# Patient Record
Sex: MALE | Race: WHITE
[De-identification: names, ages, dates, MRNs, and addresses within clinical notes are randomized per-mention and may not be internally consistent; named-entity substitution may affect disease eponyms.]

---

## 2017-01-31 ENCOUNTER — HOSPITAL ENCOUNTER (EMERGENCY)
Dept: HOSPITAL 47 - EC | Age: 67
Discharge: HOME | End: 2017-01-31
Payer: MEDICARE

## 2017-01-31 VITALS — TEMPERATURE: 97.9 F | HEART RATE: 67 BPM | DIASTOLIC BLOOD PRESSURE: 73 MMHG | SYSTOLIC BLOOD PRESSURE: 133 MMHG

## 2017-01-31 VITALS — RESPIRATION RATE: 18 BRPM

## 2017-01-31 DIAGNOSIS — Z88.0: ICD-10-CM

## 2017-01-31 DIAGNOSIS — G20: ICD-10-CM

## 2017-01-31 DIAGNOSIS — Z87.891: ICD-10-CM

## 2017-01-31 DIAGNOSIS — Z91.81: ICD-10-CM
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
## 2017-01-31 DIAGNOSIS — S22.32XA: Primary | ICD-10-CM
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
## 2017-01-31 LAB
ALP SERPL-CCNC: 81 U/L (ref 38–126)
ALT SERPL-CCNC: 23 U/L (ref 21–72)
ANION GAP SERPL CALC-SCNC: 10 MMOL/L
AST SERPL-CCNC: 21 U/L (ref 17–59)
BASOPHILS # BLD AUTO: 0 K/UL (ref 0–0.2)
BASOPHILS NFR BLD AUTO: 0 %
BUN SERPL-SCNC: 18 MG/DL (ref 9–20)
CALCIUM SPEC-MCNC: 9 MG/DL (ref 8.4–10.2)
CH: 31.9
CHCM: 34.2
CHLORIDE SERPL-SCNC: 104 MMOL/L (ref 98–107)
CO2 SERPL-SCNC: 29 MMOL/L (ref 22–30)
EOSINOPHIL # BLD AUTO: 0.2 K/UL (ref 0–0.7)
EOSINOPHIL NFR BLD AUTO: 3 %
ERYTHROCYTE [DISTWIDTH] IN BLOOD BY AUTOMATED COUNT: 4.9 M/UL (ref 4.3–5.9)
ERYTHROCYTE [DISTWIDTH] IN BLOOD: 12.5 % (ref 11.5–15.5)
GLUCOSE SERPL-MCNC: 93 MG/DL (ref 74–99)
HCT VFR BLD AUTO: 45.9 % (ref 39–53)
HDW: 2.85
HGB BLD-MCNC: 15.1 GM/DL (ref 13–17.5)
LUC NFR BLD AUTO: 3 %
LYMPHOCYTES # SPEC AUTO: 1.9 K/UL (ref 1–4.8)
LYMPHOCYTES NFR SPEC AUTO: 25 %
MCH RBC QN AUTO: 30.9 PG (ref 25–35)
MCHC RBC AUTO-ENTMCNC: 33 G/DL (ref 31–37)
MCV RBC AUTO: 93.8 FL (ref 80–100)
MONOCYTES # BLD AUTO: 0.5 K/UL (ref 0–1)
MONOCYTES NFR BLD AUTO: 6 %
NEUTROPHILS # BLD AUTO: 4.6 K/UL (ref 1.3–7.7)
NEUTROPHILS NFR BLD AUTO: 62 %
NON-AFRICAN AMERICAN GFR(MDRD): >60
POTASSIUM SERPL-SCNC: 4.8 MMOL/L (ref 3.5–5.1)
PROT SERPL-MCNC: 6.9 G/DL (ref 6.3–8.2)
SODIUM SERPL-SCNC: 143 MMOL/L (ref 137–145)
WBC # BLD AUTO: 0.2 10*3/UL
WBC # BLD AUTO: 7.4 K/UL (ref 3.8–10.6)
WBC (PEROX): 7.41

## 2017-01-31 PROCEDURE — 96365 THER/PROPH/DIAG IV INF INIT: CPT

## 2017-01-31 PROCEDURE — 85025 COMPLETE CBC W/AUTO DIFF WBC: CPT

## 2017-01-31 PROCEDURE — 96375 TX/PRO/DX INJ NEW DRUG ADDON: CPT

## 2017-01-31 PROCEDURE — 99283 EMERGENCY DEPT VISIT LOW MDM: CPT

## 2017-01-31 PROCEDURE — 96366 THER/PROPH/DIAG IV INF ADDON: CPT

## 2017-01-31 PROCEDURE — 80053 COMPREHEN METABOLIC PANEL: CPT

## 2017-01-31 PROCEDURE — 74177 CT ABD & PELVIS W/CONTRAST: CPT

## 2017-01-31 PROCEDURE — 71260 CT THORAX DX C+: CPT

## 2017-01-31 PROCEDURE — 36415 COLL VENOUS BLD VENIPUNCTURE: CPT

## 2017-01-31 NOTE — ED
General Adult HPI





- General


Chief complaint: Fall


Stated complaint: Fall/Ribs hurting


Time Seen by Provider: 01/31/17 21:05


Source: patient, RN notes reviewed


Mode of arrival: ambulatory


Limitations: no limitations





- History of Present Illness


Initial comments: 





This is a 67-year-old male who presents to the emergency department stating he 

fell 4 days ago up against a dresser.  Patient states he has Parkinson disease 

and these often off balance occasionally will fall.  Patient states he struck 

the left lateral aspect of his ribs and his tenderness in that area as well as 

just under the ribs.  Patient denies any nausea or vomiting.  Patient denies 

any difficulty breathing shortness of breath.  Patient states the pain seemed 

of gotten worse today and that is the reason he came to the emergency 

department.  Patient denies any head injury or neck injury.  Patient denies any 

numbness weakness.





- Related Data


 Previous Rx's











 Medication  Instructions  Recorded


 


Hydrocodone/Acetaminophen [Norco 1 each PO Q4HR PRN #20 tab 01/31/17





5-325]  


 


Ibuprofen [Motrin] 600 mg PO Q6HR PRN #20 tab 01/31/17











 Allergies











Allergy/AdvReac Type Severity Reaction Status Date / Time


 


Penicillins AdvReac  Diarrhea Verified 01/31/17 22:22





   (C-Diff)  














Review of Systems


ROS Statement: 


Those systems with pertinent positive or pertinent negative responses have been 

documented in the HPI.





ROS Other: All systems not noted in ROS Statement are negative.





Past Medical History


Additional Past Medical History / Comment(s): parkinson's


History of Any Multi-Drug Resistant Organisms: C-DIFF


Date of last positivie culture/infection: 01/24/2017


MDRO Source:: "marginal" c-diff, pt states this is "all gone"


Past Surgical History: Orthopedic Surgery


Additional Past Surgical History / Comment(s): right knee sx, back sx


Past Psychological History: No Psychological Hx Reported


Smoking Status: Former smoker


Past Alcohol Use History: Occasional


Past Drug Use History: None Reported





General Exam





- General Exam Comments


Initial Comments: 





GENERAL:


Patient is well-developed and well-nourished.  Patient is nontoxic and well-

hydrated and is in mild distress.





ENT:


Neck is soft and supple.  No significant lymphadenopathy is noted.  Oropharynx 

is clear.  Moist mucous membranes.  Neck has full range of motion without 

eliciting any pain.  





EYES:


The sclera were anicteric and conjunctiva were pink and moist.  Extraocular 

movements were intact and pupils were equal round and reactive to light.  

Eyelids were unremarkable.





PULMONARY:


Unlabored respirations.  Good breath sounds bilaterally.  No audible rales 

rhonchi or wheezing was noted.





CARDIOVASCULAR:


There is a regular rate and rhythm without any murmurs gallops or rubs.  There 

is ecchymosis just below the left lateral ribs the area above that on the 12th 

11th and 10th rib are is tender there is also tenderness in the left upper 

quadrant.





ABDOMEN:


There is tenderness in the left upper quadrant  No palpable organomegaly was 

noted.  There is no palpable pulsatile mass.





SKIN:


Skin is clear with no lesions or rashes and otherwise unremarkable.





NEUROLOGIC:


Patient is alert and oriented x3.  Cranial nerves II through XII are grossly 

intact.  Motor and sensory are also intact.  Normal speech, volume and content.

  Symmetrical smile.  .





MUSCULOSKELETAL:


Normal extremities with adequate strength and full range of motion.  No lower 

extremity swelling or edema.  No calf tenderness.





LYMPHATICS:


No significant lymphadenopathy is noted





PSYCHIATRIC:


Normal psychiatric evaluation.  


Limitations: no limitations





Course


 Vital Signs











  01/31/17





  21:08


 


Temperature 97.0 F L


 


Pulse Rate 74


 


Respiratory 18





Rate 


 


Blood Pressure 160/81


 


O2 Sat by Pulse 98





Oximetry 














Medical Decision Making





- Medical Decision Making





CT of the chest abdomen pelvis showed a 12th rib fracture on the left.





- Lab Data


Result diagrams: 


 01/31/17 21:39





 01/31/17 21:39


 Lab Results











  01/31/17 01/31/17 Range/Units





  21:39 21:39 


 


WBC  7.4   (3.8-10.6)  k/uL


 


RBC  4.90   (4.30-5.90)  m/uL


 


Hgb  15.1   (13.0-17.5)  gm/dL


 


Hct  45.9   (39.0-53.0)  %


 


MCV  93.8   (80.0-100.0)  fL


 


MCH  30.9   (25.0-35.0)  pg


 


MCHC  33.0   (31.0-37.0)  g/dL


 


RDW  12.5   (11.5-15.5)  %


 


Plt Count  296   (150-450)  k/uL


 


Neutrophils %  62   %


 


Lymphocytes %  25   %


 


Monocytes %  6   %


 


Eosinophils %  3   %


 


Basophils %  0   %


 


Neutrophils #  4.6   (1.3-7.7)  k/uL


 


Lymphocytes #  1.9   (1.0-4.8)  k/uL


 


Monocytes #  0.5   (0-1.0)  k/uL


 


Eosinophils #  0.2   (0-0.7)  k/uL


 


Basophils #  0.0   (0-0.2)  k/uL


 


Sodium   143  (137-145)  mmol/L


 


Potassium   4.8  (3.5-5.1)  mmol/L


 


Chloride   104  ()  mmol/L


 


Carbon Dioxide   29  (22-30)  mmol/L


 


Anion Gap   10  mmol/L


 


BUN   18  (9-20)  mg/dL


 


Creatinine   1.10  (0.66-1.25)  mg/dL


 


Est GFR (MDRD) Af Amer   >60  (>60 ml/min/1.73 sqM)  


 


Est GFR (MDRD) Non-Af   >60  (>60 ml/min/1.73 sqM)  


 


Glucose   93  (74-99)  mg/dL


 


Calcium   9.0  (8.4-10.2)  mg/dL


 


Total Bilirubin   0.8  (0.2-1.3)  mg/dL


 


AST   21  (17-59)  U/L


 


ALT   23  (21-72)  U/L


 


Alkaline Phosphatase   81  ()  U/L


 


Total Protein   6.9  (6.3-8.2)  g/dL


 


Albumin   4.0  (3.5-5.0)  g/dL














Disposition


Clinical Impression: 


 Rib fracture





Disposition: HOME SELF-CARE


Condition: Good


Instructions:  Rib Fracture (ED)


Prescriptions: 


Hydrocodone/Acetaminophen [Norco 5-325] 1 each PO Q4HR PRN #20 tab


 PRN Reason: Pain


Ibuprofen [Motrin] 600 mg PO Q6HR PRN #20 tab


 PRN Reason: For pain   


Referrals: 


Sammy Boyer MD [Primary Care Provider] - 1-2 days


Time of Disposition: 23:34

## 2017-01-31 NOTE — CT
EXAMINATION TYPE: CT ChestAbdPelvis w con

 

DATE OF EXAM: 1/31/2017 10:46 PM

 

COMPARISON: NONE

 

HISTORY: Pt states of left side rib and abdominal pain after fall injury x3 days ago.

 

CT DLP: 1067.3 mGycm

Automated exposure control for dose reduction was used.

 

CONTRAST: 

CT scan of the chest, abdomen and pelvis is performed without Oral Contrast and with IV Contrast, pat
ient injected with 100 mL of Omnipaque 300.

 

FINDINGS:

 

LUNGS: Mild atelectasis and scarring is suggested in both lung bases posteriorly. Mild emphysematous 
changes are suggested bilaterally. Faint interstitial opacities are noted in the right midlung field 
and are probably related to chronic changes. No significant lung nodules are noted.

There is evidence of acute left 12th rib fracture in the posterior lateral aspect. No pneumothorax or
 pleural effusion is noted.

 

MEDIASTINUM: There are no greater than 1 cm hilar or mediastinal lymph nodes.   No pericardial effusi
on is seen.  

 

OTHER:  No additional significant abnormality is seen.

 

LIVER/GB: No significant abnormality is appreciated.

 

PANCREAS: No significant abnormality is seen.

 

SPLEEN: Spleen appears intact at present with adjacent left 12th rib fracture.

 

ADRENALS: No significant abnormality is seen.

 

KIDNEYS: There is 3.2 x 4.5 cm benign-appearing cyst in the upper pole area of right kidney. No signi
ficant hydronephrosis or obstructing stones are noted in both kidneys.

 

BOWEL:  Moderate to large amount of fecal material is noted in the colon and patient is constipated. 
There is mild colonic diverticulosis. Appendix is gas-filled and appears grossly unremarkable in the 
axial image 91.

 

REPRODUCTIVE ORGANS: No gross abnormality seen.

 

LYMPH NODES: No greater than 1 cm abdominal or pelvic lymph nodes are appreciated.

 

OSSEOUS STRUCTURES: Multilevel degenerative changes are present in the thoracolumbar spine with mild 
old wedge compression deformities of mid and lower thoracic vertebrae. Multilevel degenerative disc d
isease changes and vacuum disc phenomenon are noted in the lower thoracic and lumbar spine.

 

There is evidence of acute slightly displaced left 12th rib fracture in the posterolateral aspect in 
the axial image 70 and coronal image 71. Possibility of nondisplaced left 11th rib fracture cannot be
 excluded. Surrounding mild soft tissue swelling is suggested at the fracture site of the left 12th r
ib.

 

There is slight irregularity in the anterior portions of acetabulum in the axial image 114 bilaterall
y and is probably related to old fracture changes. Somewhat sclerotic foci are noted in the right edmundo
ac bone and are most likely benign bone islands.

 

OTHER: Mild atherosclerotic calcification is noted in the abdominal aorta and iliac arteries.

 

IMPRESSION: 

1. Left 12th rib acute slightly displaced fracture in the posterior lateral aspect with surrounding s
oft tissue swelling.

2. No pneumothorax or pleural effusion. Mild atelectasis is noted in both lung bases.

3. Right renal cyst.

4. Patient is constipated. Mild colonic diverticulosis.

5. No definite solid organ injury is noted. Spleen appears intact.

A phone report is given to Dr. Hayes at the time of the dictation.

## 2017-02-14 ENCOUNTER — HOSPITAL ENCOUNTER (EMERGENCY)
Dept: HOSPITAL 47 - EC | Age: 67
Discharge: HOME | End: 2017-02-14
Payer: MEDICARE

## 2017-02-14 VITALS — HEART RATE: 70 BPM | DIASTOLIC BLOOD PRESSURE: 82 MMHG | SYSTOLIC BLOOD PRESSURE: 145 MMHG

## 2017-02-14 VITALS — TEMPERATURE: 97.1 F

## 2017-02-14 VITALS — RESPIRATION RATE: 18 BRPM

## 2017-02-14 DIAGNOSIS — Z88.0: ICD-10-CM

## 2017-02-14 DIAGNOSIS — R41.82: ICD-10-CM

## 2017-02-14 DIAGNOSIS — I63.9: Primary | ICD-10-CM

## 2017-02-14 DIAGNOSIS — Z87.891: ICD-10-CM

## 2017-02-14 LAB
ALP SERPL-CCNC: 82 U/L (ref 38–126)
ALT SERPL-CCNC: 19 U/L (ref 21–72)
ANION GAP SERPL CALC-SCNC: 7 MMOL/L
APTT BLD: 23.2 SEC (ref 22–30)
AST SERPL-CCNC: 24 U/L (ref 17–59)
BASOPHILS # BLD AUTO: 0 K/UL (ref 0–0.2)
BASOPHILS NFR BLD AUTO: 1 %
BUN SERPL-SCNC: 16 MG/DL (ref 9–20)
CALCIUM SPEC-MCNC: 8.8 MG/DL (ref 8.4–10.2)
CH: 31.8
CHCM: 33.9
CHLORIDE SERPL-SCNC: 105 MMOL/L (ref 98–107)
CO2 SERPL-SCNC: 29 MMOL/L (ref 22–30)
EOSINOPHIL # BLD AUTO: 0.3 K/UL (ref 0–0.7)
EOSINOPHIL NFR BLD AUTO: 5 %
ERYTHROCYTE [DISTWIDTH] IN BLOOD BY AUTOMATED COUNT: 4.57 M/UL (ref 4.3–5.9)
ERYTHROCYTE [DISTWIDTH] IN BLOOD: 12.8 % (ref 11.5–15.5)
GLUCOSE BLD-MCNC: 90 MG/DL (ref 75–99)
GLUCOSE SERPL-MCNC: 87 MG/DL (ref 74–99)
HCT VFR BLD AUTO: 43 % (ref 39–53)
HDW: 2.81
HGB BLD-MCNC: 14.1 GM/DL (ref 13–17.5)
INR PPP: 1 (ref ?–1.1)
LUC NFR BLD AUTO: 3 %
LYMPHOCYTES # SPEC AUTO: 2 K/UL (ref 1–4.8)
LYMPHOCYTES NFR SPEC AUTO: 32 %
MCH RBC QN AUTO: 30.8 PG (ref 25–35)
MCHC RBC AUTO-ENTMCNC: 32.7 G/DL (ref 31–37)
MCV RBC AUTO: 94.1 FL (ref 80–100)
MONOCYTES # BLD AUTO: 0.4 K/UL (ref 0–1)
MONOCYTES NFR BLD AUTO: 6 %
NEUTROPHILS # BLD AUTO: 3.3 K/UL (ref 1.3–7.7)
NEUTROPHILS NFR BLD AUTO: 53 %
NON-AFRICAN AMERICAN GFR(MDRD): >60
PH UR: 6.5 [PH] (ref 5–8)
POTASSIUM SERPL-SCNC: 4.2 MMOL/L (ref 3.5–5.1)
PROT SERPL-MCNC: 6.6 G/DL (ref 6.3–8.2)
PT BLD: 10.4 SEC (ref 9–12)
SODIUM SERPL-SCNC: 141 MMOL/L (ref 137–145)
SP GR UR: 1.01 (ref 1–1.03)
UA BILLING (MACRO VS. MICRO): (no result)
UROBILINOGEN UR QL STRIP: <2 MG/DL (ref ?–2)
WBC # BLD AUTO: 0.2 10*3/UL
WBC # BLD AUTO: 6.2 K/UL (ref 3.8–10.6)
WBC (PEROX): 6.17

## 2017-02-14 PROCEDURE — 36415 COLL VENOUS BLD VENIPUNCTURE: CPT

## 2017-02-14 PROCEDURE — 85025 COMPLETE CBC W/AUTO DIFF WBC: CPT

## 2017-02-14 PROCEDURE — 71010: CPT

## 2017-02-14 PROCEDURE — 80053 COMPREHEN METABOLIC PANEL: CPT

## 2017-02-14 PROCEDURE — 81003 URINALYSIS AUTO W/O SCOPE: CPT

## 2017-02-14 PROCEDURE — 84484 ASSAY OF TROPONIN QUANT: CPT

## 2017-02-14 PROCEDURE — 85730 THROMBOPLASTIN TIME PARTIAL: CPT

## 2017-02-14 PROCEDURE — 85610 PROTHROMBIN TIME: CPT

## 2017-02-14 PROCEDURE — 70450 CT HEAD/BRAIN W/O DYE: CPT

## 2017-02-14 PROCEDURE — 99285 EMERGENCY DEPT VISIT HI MDM: CPT

## 2017-02-14 PROCEDURE — 93005 ELECTROCARDIOGRAM TRACING: CPT

## 2017-02-14 NOTE — ED
Neuro HPI





- General


Chief Complaint: Neuro Symptoms/Deficit


Stated Complaint: CVA


Time Seen by Provider: 02/14/17 18:42


Source: patient


Mode of arrival: EMS


Limitations: no limitations





- History of Present Illness


Is the patient presenting with stroke symptoms?: Yes


Last Known Well Date: 02/14/17


-: hour(s)


Initial Comments: 





's patient is a 67-year-old man brought to be evaluated for altered mental 

status.  The patient had been at home tonight, and his wife called to ask him 

to give her phone number.  He seemed to not be responding correctly and was 

unable to locate a number and give it to her which he otherwise would've been 

able to do.  He was subsequently brought here concern of possible stroke.  The 

exact onset time is not able to be established.


Location: speech, altered


History of same: Yes


Place: home


Improves With: none


Worsens With: none


Context: sudden onset


Associated Symptoms: confusion


Treatments Prior to Arrival: none





- Related Data


Home Medications: 


 Home Medications











 Medication  Instructions  Recorded  Confirmed


 


Amantadine HCl [Symmetrel] 100 mg PO TID 02/14/17 02/14/17


 


Carbidopa/Levodopa/Entacapone 1 tab PO 5XD 02/14/17 02/14/17





[Stalevo 200]   











Allergies/Adverse Reactions: 


 Allergies











Allergy/AdvReac Type Severity Reaction Status Date / Time


 


Penicillins AdvReac  Diarrhea Verified 02/14/17 20:04





   (C-Diff)  














Review of Systems


ROS Statement: 


Those systems with pertinent positive or pertinent negative responses have been 

documented in the HPI.





ROS Other: All systems not noted in ROS Statement are negative.


Constitutional: Denies: fever, chills


Eyes: Denies: vision change


ENT: Denies: hearing loss


Respiratory: Denies: cough, dyspnea


Cardiovascular: Denies: chest pain, palpitations, edema, syncope


Gastrointestinal: Denies: abdominal pain, vomiting, diarrhea


Genitourinary: Denies: dysuria, hematuria


Musculoskeletal: Denies: back pain


Skin: Denies: rash


Neurological: Reports: as per HPI, confusion.  Denies: headache, weakness, 

numbness, paresthesias





General Exam


Limitations: no limitations


General appearance: alert, in no apparent distress


Head exam: Present: atraumatic, normocephalic, normal inspection


Eye exam: Present: normal appearance, PERRL, EOMI.  Absent: scleral icterus, 

conjunctival injection


ENT exam: Present: normal oropharynx


Neck exam: Present: normal inspection, full ROM


Respiratory exam: Present: normal lung sounds bilaterally.  Absent: respiratory 

distress, wheezes, rales, rhonchi, stridor


Cardiovascular Exam: Present: regular rate, normal heart sounds.  Absent: 

systolic murmur, diastolic murmur


GI/Abdominal exam: Present: soft.  Absent: distended, tenderness, guarding, 

rebound, rigid


Extremities exam: Present: normal inspection, normal capillary refill.  Absent: 

pedal edema, calf tenderness


Back exam: Present: normal inspection.  Absent: CVA tenderness (R), CVA 

tenderness (L)


Neurological exam: Present: alert, oriented X3.  Absent: CN II-XII intact, 

motor sensory deficit


Skin exam: Present: warm, dry, intact, normal color.  Absent: rash





Stroke MDM





- Lab Data


Result diagrams: 


 02/14/17 18:44





 02/14/17 18:44


 Lab Results











  02/14/17 02/14/17 02/14/17 Range/Units





  18:36 18:44 18:44 


 


WBC   6.2   (3.8-10.6)  k/uL


 


RBC   4.57   (4.30-5.90)  m/uL


 


Hgb   14.1   (13.0-17.5)  gm/dL


 


Hct   43.0   (39.0-53.0)  %


 


MCV   94.1   (80.0-100.0)  fL


 


MCH   30.8   (25.0-35.0)  pg


 


MCHC   32.7   (31.0-37.0)  g/dL


 


RDW   12.8   (11.5-15.5)  %


 


Plt Count   182   (150-450)  k/uL


 


Neutrophils %   53   %


 


Lymphocytes %   32   %


 


Monocytes %   6   %


 


Eosinophils %   5   %


 


Basophils %   1   %


 


Neutrophils #   3.3   (1.3-7.7)  k/uL


 


Lymphocytes #   2.0   (1.0-4.8)  k/uL


 


Monocytes #   0.4   (0-1.0)  k/uL


 


Eosinophils #   0.3   (0-0.7)  k/uL


 


Basophils #   0.0   (0-0.2)  k/uL


 


PT     (9.0-12.0)  sec


 


INR     (<1.1)  


 


APTT     (22.0-30.0)  sec


 


Sodium    141  (137-145)  mmol/L


 


Potassium    4.2  (3.5-5.1)  mmol/L


 


Chloride    105  ()  mmol/L


 


Carbon Dioxide    29  (22-30)  mmol/L


 


Anion Gap    7  mmol/L


 


BUN    16  (9-20)  mg/dL


 


Creatinine    0.80  (0.66-1.25)  mg/dL


 


Est GFR (MDRD) Af Amer    >60  (>60 ml/min/1.73 sqM)  


 


Est GFR (MDRD) Non-Af    >60  (>60 ml/min/1.73 sqM)  


 


Glucose    87  (74-99)  mg/dL


 


POC Glucose (mg/dL)  90    (75-99)  mg/dL


 


POC Glu Operater ID  Marychuy Arenas    


 


Calcium    8.8  (8.4-10.2)  mg/dL


 


Total Bilirubin    0.9  (0.2-1.3)  mg/dL


 


AST    24  (17-59)  U/L


 


ALT    19 L  (21-72)  U/L


 


Alkaline Phosphatase    82  ()  U/L


 


Troponin I     (0.000-0.034)  ng/mL


 


Total Protein    6.6  (6.3-8.2)  g/dL


 


Albumin    3.8  (3.5-5.0)  g/dL


 


Urine Color     


 


Urine Appearance     (Clear)  


 


Urine pH     (5.0-8.0)  


 


Ur Specific Gravity     (1.001-1.035)  


 


Urine Protein     (Negative)  


 


Urine Glucose (UA)     (Negative)  


 


Urine Ketones     (Negative)  


 


Urine Blood     (Negative)  


 


Urine Nitrate     (Negative)  


 


Urine Bilirubin     (Negative)  


 


Urine Urobilinogen     (<2.0)  mg/dL


 


Ur Leukocyte Esterase     (Negative)  














  02/14/17 02/14/17 02/14/17 Range/Units





  18:44 18:44 19:44 


 


WBC     (3.8-10.6)  k/uL


 


RBC     (4.30-5.90)  m/uL


 


Hgb     (13.0-17.5)  gm/dL


 


Hct     (39.0-53.0)  %


 


MCV     (80.0-100.0)  fL


 


MCH     (25.0-35.0)  pg


 


MCHC     (31.0-37.0)  g/dL


 


RDW     (11.5-15.5)  %


 


Plt Count     (150-450)  k/uL


 


Neutrophils %     %


 


Lymphocytes %     %


 


Monocytes %     %


 


Eosinophils %     %


 


Basophils %     %


 


Neutrophils #     (1.3-7.7)  k/uL


 


Lymphocytes #     (1.0-4.8)  k/uL


 


Monocytes #     (0-1.0)  k/uL


 


Eosinophils #     (0-0.7)  k/uL


 


Basophils #     (0-0.2)  k/uL


 


PT  10.4    (9.0-12.0)  sec


 


INR  1.0    (<1.1)  


 


APTT  23.2    (22.0-30.0)  sec


 


Sodium     (137-145)  mmol/L


 


Potassium     (3.5-5.1)  mmol/L


 


Chloride     ()  mmol/L


 


Carbon Dioxide     (22-30)  mmol/L


 


Anion Gap     mmol/L


 


BUN     (9-20)  mg/dL


 


Creatinine     (0.66-1.25)  mg/dL


 


Est GFR (MDRD) Af Amer     (>60 ml/min/1.73 sqM)  


 


Est GFR (MDRD) Non-Af     (>60 ml/min/1.73 sqM)  


 


Glucose     (74-99)  mg/dL


 


POC Glucose (mg/dL)     (75-99)  mg/dL


 


POC Glu Operater ID     


 


Calcium     (8.4-10.2)  mg/dL


 


Total Bilirubin     (0.2-1.3)  mg/dL


 


AST     (17-59)  U/L


 


ALT     (21-72)  U/L


 


Alkaline Phosphatase     ()  U/L


 


Troponin I   <0.012   (0.000-0.034)  ng/mL


 


Total Protein     (6.3-8.2)  g/dL


 


Albumin     (3.5-5.0)  g/dL


 


Urine Color    Dark Yellow  


 


Urine Appearance    Clear  (Clear)  


 


Urine pH    6.5  (5.0-8.0)  


 


Ur Specific Gravity    1.013  (1.001-1.035)  


 


Urine Protein    Negative  (Negative)  


 


Urine Glucose (UA)    Negative  (Negative)  


 


Urine Ketones    Negative  (Negative)  


 


Urine Blood    Negative  (Negative)  


 


Urine Nitrate    Negative  (Negative)  


 


Urine Bilirubin    Negative  (Negative)  


 


Urine Urobilinogen    <2.0  (<2.0)  mg/dL


 


Ur Leukocyte Esterase    Negative  (Negative)  














- Medical Decision Making





Patient is 67-year-old man presenting with change in mental status, mainly 

expressive aphasia, which has resolved.  Per the patient's family he does seem 

back to his usual self.  The ABCD 2 score indicates that the patient may be 

suitable for outpatient workup and that is his expressed desire.  I did offer 

admission with neurology consultation, but at this point he is declining.  They 

will return should there be any recurrence of symptoms or any new symptoms.  He 

is otherwise going to follow up to have further workup within the coming few 

days.





Past Medical History


Additional Past Medical History / Comment(s): parkinson's


History of Any Multi-Drug Resistant Organisms: C-DIFF


Date of last positivie culture/infection: 01/24/2017


MDRO Source:: "marginal" c-diff, pt states this is "all gone"


Past Surgical History: Orthopedic Surgery


Additional Past Surgical History / Comment(s): right knee sx, back sx


Past Psychological History: No Psychological Hx Reported


Smoking Status: Former smoker


Past Alcohol Use History: Occasional


Past Drug Use History: None Reported





Course


 Vital Signs











  02/14/17 02/14/17 02/14/17





  18:32 19:05 19:15


 


Temperature 97.1 F L  


 


Pulse Rate 62 58 L 61


 


Respiratory 16 16 18





Rate   


 


Blood Pressure 148/96 133/74 131/72


 


O2 Sat by Pulse 97 98 98





Oximetry   














  02/14/17 02/14/17





  19:30 20:00


 


Temperature  


 


Pulse Rate 58 L 70


 


Respiratory 18 18





Rate  


 


Blood Pressure 131/77 145/82


 


O2 Sat by Pulse 99 97





Oximetry  














Disposition


Clinical Impression: 


 Transient cerebral ischemia, Mental status change, Cerebrovascular accident





Disposition: HOME SELF-CARE


Condition: Good


Instructions:  Transient Ischemic Attack (ED)


Referrals: 


Sammy Boyer MD [Primary Care Provider] - 1-2 days


Saeid Chiu MD [STAFF PHYSICIAN] - 1-2 days

## 2017-02-14 NOTE — XR
EXAMINATION TYPE: XR chest 1V portable

 

DATE OF EXAM: 2/14/2017 7:05 PM

 

COMPARISON: NONE

 

HISTORY: Altered mental status

 

TECHNIQUE: Single frontal view of the chest is obtained.

 

FINDINGS:  There is no focal air space opacity, pleural effusion, or pneumothorax seen.  The cardiac 
silhouette size is within normal limits.   There are overlying cardiac leads. The osseous structures 
are intact.

 

IMPRESSION:  No acute process.

## 2017-02-14 NOTE — CT
EXAMINATION TYPE: CT brain wo con

 

DATE OF EXAM: 2/14/2017 7:03 PM

 

COMPARISON: NONE

 

HISTORY: Possible cva. Headache and memory changes. Altered mental status History of Parkinson's

 

CT DLP: 1116.10 mGycm

Automated exposure control for dose reduction was used.

 

FINDINGS: 

There is no acute intracranial hemorrhage, mass effect, or midline shift identified.  The ventricles 
and sulci are within normal limits in size. There is mild cortical atrophy. Periventricular white mat
ter demyelination is suspected. The globes are intact and the visualized sinuses are clear.

 

IMPRESSION: 

No acute intracranial hemorrhage, mass effect, or midline shift is seen.

## 2018-06-14 ENCOUNTER — HOSPITAL ENCOUNTER (EMERGENCY)
Dept: HOSPITAL 47 - EC | Age: 68
Discharge: HOME | End: 2018-06-14
Payer: MEDICARE

## 2018-06-14 VITALS — DIASTOLIC BLOOD PRESSURE: 62 MMHG | SYSTOLIC BLOOD PRESSURE: 130 MMHG | HEART RATE: 57 BPM

## 2018-06-14 VITALS — TEMPERATURE: 98.4 F | RESPIRATION RATE: 18 BRPM

## 2018-06-14 DIAGNOSIS — Z88.0: ICD-10-CM

## 2018-06-14 DIAGNOSIS — M51.36: Primary | ICD-10-CM
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
## 2018-06-14 DIAGNOSIS — M48.061: ICD-10-CM

## 2018-06-14 DIAGNOSIS — M99.73: ICD-10-CM

## 2018-06-14 DIAGNOSIS — G20: ICD-10-CM

## 2018-06-14 DIAGNOSIS — Z79.899: ICD-10-CM

## 2018-06-14 DIAGNOSIS — Z87.891: ICD-10-CM

## 2018-06-14 DIAGNOSIS — M41.86: ICD-10-CM

## 2018-06-14 DIAGNOSIS — M43.17: ICD-10-CM

## 2018-06-14 DIAGNOSIS — Z98.890: ICD-10-CM

## 2018-06-14 PROCEDURE — 99283 EMERGENCY DEPT VISIT LOW MDM: CPT

## 2018-06-14 PROCEDURE — 72131 CT LUMBAR SPINE W/O DYE: CPT

## 2018-06-14 NOTE — CT
EXAMINATION TYPE: CT lumbar spine wo con

 

DATE OF EXAM: 6/14/2018 12:57 PM

 

COMPARISON: NONE

 

HISTORY: Low back pain

 

CT DLP: 440 mGycm

Automated exposure control for dose reduction was used.

 

TECHNIQUE: Unenhanced CT of the lumbar spine was performed.  Bone and soft tissue window settings are
 submitted as well as coronal and sagittal reconstructions. 

 

Osseous findings: There is severe degenerative changes of the lumbar spine. No evidence of vertebral 
body height loss. There is very mild grade 1 anterolisthesis of L5 on S1, likely on a degenerative ba
sis intervertebral disc desiccation, multilevel vacuum disc disease, anterior osteophytes, facet arth
ropathy, endplate sclerosis and intervertebral disc space narrowing are noted. Minimal bibasilar subs
egmental atelectasis is seen at the lung bases. Exophytic right upper pole renal cyst is seen measuri
ng 4.6 cm. Mild-to-moderate atherosclerosis of the abdominal aorta and its branches are incidentally 
seen. There is a S-shaped scoliotic curvature of the lumbar spine.

 

L1-L2: There is disc desiccation and a broad-based disc bulge with mild bilateral neural foraminal na
rrowing. No significant spinal canal stenosis.

 

L2-L3: There is facet arthropathy and disc desiccation with a broad-based disc bulge and osteophytes 
creating moderate left and mild right neural foraminal narrowing. No spinal canal stenosis.

 

L3-L4: Extensive facet arthropathy, curvature from the scoliosis, and degenerative disc disease creat
e moderate to severe left and mild right neural foraminal narrowing in addition to moderate spinal ca
nal stenosis.

 

L4-L5: Extensive facet arthropathy and degenerative disc disease create severe right and mild left ne
ural foraminal narrowing.

 

L5-S1: There is disc uncovering and a broad-based disc bulge resulting in mild left and mild-to-moder
ate right neural foraminal narrowing. No spinal canal stenosis.

 

IMPRESSION:

1. Extensive multilevel lumbar degenerative disc disease and S-shaped scoliosis of the lumbar spine t
hat contribute to multilevel spinal canal stenosis and variable degree neural foraminal narrowing as 
described above.

2. No evidence of vertebral body height loss. Grade 1 anterolisthesis of L5 on S1 is likely degenerat
fazal in nature.

## 2018-06-14 NOTE — ED
General Adult HPI





- General


Chief complaint: Back Pain/Injury


Stated complaint: back pain


Time Seen by Provider: 06/14/18 11:50


Source: patient, RN notes reviewed


Mode of arrival: ambulatory


Limitations: no limitations





- History of Present Illness


Initial comments: 





Chief complaint and history of present illness this is a 68-year-old male who 

reports that approximately 2:58 AM this morning he rolled over in bed and 

developed acute severe discomfort to his right paralumbar region and radiates 

to the right upper buttock.  The patient does have Parkinson's.  He denies 

having any difficulty urinating or bowel movements since then.  His wife gave 

him a pain pill source helped relieve some of the pain.





- Related Data


 Home Medications











 Medication  Instructions  Recorded  Confirmed


 


Amantadine HCl [Symmetrel] 100 mg PO TID 02/14/17 06/14/18


 


Carbidopa/Levodopa/Entacapone 1 tab PO 5XD 02/14/17 06/14/18





[Stalevo 200]   


 


Meloxicam [Mobic] 15 mg PO DAILY PRN 06/14/18 06/14/18








 Previous Rx's











 Medication  Instructions  Recorded


 


Acetaminophen with Codeine 1 tab PO Q4H PRN 3 Days #18 tab 06/14/18





[Tylenol w/codeine #3]  


 


methylPREDNISolone Dose Pack 4 mg PO AS DIRECTED #21 package 06/14/18





[Medrol Dose Pack]  











 Allergies











Allergy/AdvReac Type Severity Reaction Status Date / Time


 


Penicillins AdvReac  Diarrhea Verified 06/14/18 11:42





   (C-Diff)  














Review of Systems


ROS Statement: 


Those systems with pertinent positive or pertinent negative responses have been 

documented in the HPI.


Review of systems.  Patient denies any headache no visual acuity changes no 

cervical or thoracic pain.  Has discomfort to the lumbar region as noted above.

  No numbness no tingling distally.  No difficulty urinating or bowel 

movements.  No complaints of any nausea vomiting or diarrhea.  No new deficits.

  Patient has Parkinson's for the past 15 years.  All systems are reviewed.  

Past medical problem Parkinson's 15 years.  Denies any other medical problems.  

Surgeries include right knee surgery many years ago as well as back surgery 

probably laminectomy and no problems since this morning at 2 AM.  Family 

history no cancers.  Patient denies any ALLERGIES.  He quit smoking over 30 

years ago drinks alcohol occasionally socially.








ROS Other: All systems not noted in ROS Statement are negative.





Past Medical History


Additional Past Medical History / Comment(s): parkinson's


History of Any Multi-Drug Resistant Organisms: C-DIFF


Date of last positivie culture/infection: 01/24/2017


MDRO Source:: "marginal" c-diff, pt states this is "all gone"


Past Surgical History: Orthopedic Surgery


Additional Past Surgical History / Comment(s): right knee sx, back sx


Past Psychological History: No Psychological Hx Reported


Smoking Status: Former smoker


Past Alcohol Use History: Occasional


Past Drug Use History: None Reported





General Exam





- General Exam Comments


Initial Comments: 





General:


The patient is awake and alert, here because of pain to the right paralumbar 

region after rolling over at 2 AM.  Pain persists.  Vital signs temperature 

97.5 pulse 87 respiratory rate 20 pulse ox 99% room air blood pressure 126/67


Eye:


Pupils are equal, round and reactive to light, extra-ocular movements are intact

; there is normal conjunctiva bilaterally. No signs of icterus. 


Ears, nose, mouth and throat:


There are moist mucous membranes and no oral lesions. 


Neck:


The neck is supple,  


Cardiovascular:


There is a regular rate and rhythm. No murmur, rub or gallop is appreciated.


Respiratory:


Lungs are clear to auscultation, respirations are non-labored, breath sounds 

are equal. No wheezes, stridor, rales, or rhonchi.


Gastrointestinal:


Soft, non-distended, non-tender abdomen without masses or organomegaly noted. 

There is no rebound or guarding present. No CVA tenderness. Bowel sounds are 

unremarkable.


Back:


Evidence of previous surgery in the lumbar region.  Probably laminectomy from 

his description of the procedure.  Also pain in the right paralumbar region 

radiating to the upper right buttock area.  No rash noted.  Early shingles 

discussed.  No pain to palpation over the area.  Pain does increase with a 

twisting or turning or bending forward.  Denies any difficulty urinating or 

bowel movements.


Musculoskeletal:


Upper or lower extremities otherwise normal.


Neurological:


The patient has Parkinson's, with tremor.  For over 15 years.  He states no new 

deficits other than the acute pain suffered while rolling over in bed's morning 

at 2 AM.  No foot drop.


Skin:


No rashes


Psychiatric:


Cooperative, 





Limitations: no limitations





Course


 Vital Signs











  06/14/18 06/14/18





  11:30 12:16


 


Temperature 97.5 F L 


 


Pulse Rate 87 62


 


Respiratory 20 17





Rate  


 


Blood Pressure 126/67 136/70


 


O2 Sat by Pulse 99 100





Oximetry  














Medical Decision Making





- Medical Decision Making





Medical decision making; this is a 68-year-old male with Parkinson's.  He 

reports that around 2 AM this morning while rolling over in bed he did develop 

an acute discomfort which is persisted until he received a pain pill from his 

wife.  Still uncomfortable in the lumbar spine right sided which radiates to 

the right buttock area.  The radiologist reviewed the CAT scan the lumbar spine 

and her impression is extensive multilevel lumbar degenerative disc disease and 

S-shaped scoliosis of the lumbar spine that contribute 2 multilevel spinal 

canal stenosis and variable degree neural foraminal narrowing as described in 

the body of the report.  No evidence of vertebral body height loss.  Grade 1 

anterolisthesis of L5 on S1 is likely degenerative in nature.





I discussed with the patient the findings per CAT scan.  The plan the patient 

be placed on Medrol Dosepak advised to take Tylenol 3 which worked at home for 

pain also advised follow-up with family physician.  He'll be given the name of 

the on-call back surgeon, Dr. Stewart.





Disposition


Clinical Impression: 


 Degenerative disc disease, lumbar





Disposition: HOME SELF-CARE


Condition: Fair


Instructions:  Acute Low Back Pain (ED), Degenerative Disc Disease (ED), Lumbar 

Spinal Stenosis (ED)


Additional Instructions: 


Take medications as directed, follow-up with family physician.  Also follow-up 

with on-call back surgeon Dr. Stewart


Prescriptions: 


Acetaminophen with Codeine [Tylenol w/codeine #3] 1 tab PO Q4H PRN 3 Days #18 

tab


 PRN Reason: Pain


methylPREDNISolone Dose Pack [Medrol Dose Pack] 4 mg PO AS DIRECTED #21 package


Is patient prescribed a controlled substance at d/c from ED?: Yes


When asked, does pt state using other controlled substances?: No


If prescribed controlled substance>3 days was MAPS reviewed?: No


If opioid is for acute pain is fill amount 7 days or less?: Yes


If Rx opioid, was Start Talking consent form obtained?: Yes


Referrals: 


None,Stated [Primary Care Provider] - 1-2 days


WINIFRED Stewart DO [Doctor of Osteopathic Medicine] - 1-2 days


Time of Disposition: 13:27

## 2019-02-25 ENCOUNTER — HOSPITAL ENCOUNTER (OUTPATIENT)
Dept: HOSPITAL 47 - RADMRIMAIN | Age: 69
Discharge: HOME | End: 2019-02-25
Attending: PSYCHIATRY & NEUROLOGY
Payer: MEDICARE

## 2019-02-25 DIAGNOSIS — R90.89: Primary | ICD-10-CM

## 2019-02-25 DIAGNOSIS — F82: ICD-10-CM

## 2019-02-25 DIAGNOSIS — H53.2: ICD-10-CM

## 2019-02-25 PROCEDURE — 70553 MRI BRAIN STEM W/O & W/DYE: CPT

## 2019-02-25 PROCEDURE — 36415 COLL VENOUS BLD VENIPUNCTURE: CPT

## 2019-02-25 PROCEDURE — 82565 ASSAY OF CREATININE: CPT

## 2019-02-25 NOTE — MR
EXAMINATION TYPE: MR brain wo/w con

 

DATE OF EXAM: 2/25/2019

 

COMPARISON: CT 2/14/2017

 

HISTORY: 69-year-old male Visual disturbances

 

TECHNIQUE:  Multiplanar, multisequence images of the brain and brainstem were acquired before and aft
er administration of  11 mL IV Gadavist.  Diffusion weighted imaging is performed. Fast brain protoco
l was utilized.

 

FINDINGS:  

No evidence for acute infarction, hemorrhage, mass, mass effect, midline shift, herniation, effacemen
t of basal cisterns, or extra-axial fluid collection. 

 

The ventricles and sulci are age-appropriate.

 

Major intracranial flow voids are intact. The left vertebral artery is dominant.

 

T2/FLAIR weighted sequences show mild to moderate scattered burden of bright white matter change in t
he subcortical, deep, and periventricular regions of both cerebral hemispheres.

 

Midline structures demonstrate normal morphology.  The craniocervical junction is normal. 

 

Post contrast images demonstrate no evidence of pathologic enhancement.  Dural venous sinuses are pat
ent.

 

The visualized sinuses are clear and the globes are intact. Rightward nasal septal deviation. Small a
mount of fluid within the inferior right mastoid air cells.

 

 

IMPRESSION:

 

1. No acute intracranial abnormality seen.

2. Mild-to-moderate scattered burden of T2 bright white matter change, nonspecific, likely representi
ng changes of chronic small vessel ischemic disease.

3. Incidental small amount of trapped fluid in the inferior right mastoid air cells. Correlate for an
y mastoid pain to exclude mastoiditis.

## 2019-09-19 ENCOUNTER — HOSPITAL ENCOUNTER (EMERGENCY)
Dept: HOSPITAL 47 - EC | Age: 69
Discharge: ANOTHER HOSPITAL | End: 2019-09-19
Payer: MEDICARE

## 2019-09-19 VITALS — TEMPERATURE: 98.2 F | DIASTOLIC BLOOD PRESSURE: 78 MMHG | HEART RATE: 79 BPM | SYSTOLIC BLOOD PRESSURE: 141 MMHG

## 2019-09-19 VITALS — RESPIRATION RATE: 18 BRPM
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
## 2019-09-19 DIAGNOSIS — S80.211A: ICD-10-CM

## 2019-09-19 DIAGNOSIS — G20: ICD-10-CM

## 2019-09-19 DIAGNOSIS — S01.511A: ICD-10-CM

## 2019-09-19 DIAGNOSIS — Z79.82: ICD-10-CM
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
## 2019-09-19 DIAGNOSIS — Z88.0: ICD-10-CM

## 2019-09-19 DIAGNOSIS — S12.500A: ICD-10-CM

## 2019-09-19 DIAGNOSIS — Z91.030: ICD-10-CM

## 2019-09-19 DIAGNOSIS — Z79.899: ICD-10-CM

## 2019-09-19 DIAGNOSIS — S12.400A: Primary | ICD-10-CM

## 2019-09-19 DIAGNOSIS — S50.311A: ICD-10-CM

## 2019-09-19 DIAGNOSIS — S60.512A: ICD-10-CM
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
## 2019-09-19 DIAGNOSIS — S01.81XA: ICD-10-CM

## 2019-09-19 DIAGNOSIS — M43.22: ICD-10-CM

## 2019-09-19 DIAGNOSIS — S01.21XA: ICD-10-CM

## 2019-09-19 DIAGNOSIS — R41.82: ICD-10-CM

## 2019-09-19 DIAGNOSIS — Z87.891: ICD-10-CM

## 2019-09-19 PROCEDURE — 99285 EMERGENCY DEPT VISIT HI MDM: CPT

## 2019-09-19 PROCEDURE — 72125 CT NECK SPINE W/O DYE: CPT

## 2019-09-19 PROCEDURE — 73080 X-RAY EXAM OF ELBOW: CPT

## 2019-09-19 PROCEDURE — 96374 THER/PROPH/DIAG INJ IV PUSH: CPT

## 2019-09-19 PROCEDURE — 73562 X-RAY EXAM OF KNEE 3: CPT

## 2019-09-19 PROCEDURE — 12015 RPR F/E/E/N/L/M 7.6-12.5 CM: CPT

## 2019-09-19 PROCEDURE — 70486 CT MAXILLOFACIAL W/O DYE: CPT

## 2019-09-19 PROCEDURE — 12002 RPR S/N/AX/GEN/TRNK2.6-7.5CM: CPT

## 2019-09-19 PROCEDURE — 73130 X-RAY EXAM OF HAND: CPT

## 2019-09-19 PROCEDURE — 70450 CT HEAD/BRAIN W/O DYE: CPT

## 2019-09-19 NOTE — XR
EXAMINATION TYPE: XR hand complete LT

 

DATE OF EXAM: 9/19/2019

 

CLINICAL HISTORY: pain

 

TECHNIQUE:  Frontal, lateral and oblique images of the left hand are obtained.

 

COMPARISON: None.

 

FINDINGS:  There is no acute fracture/dislocation evident. The joint spaces appear within normal limi
ts.  The overlying soft tissue appears unremarkable.

 

IMPRESSION:  

There is no acute fracture or dislocation.

 

ICD 10 NO FRACTURE, INITIAL EVALUATION
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
## 2019-09-19 NOTE — XR
EXAMINATION TYPE: XR elbow complete RT

 

DATE OF EXAM: 9/19/2019

 

CLINICAL HISTORY: pain

 

TECHNIQUE:  Frontal, lateral and oblique images of the right elbow are obtained.

 

COMPARISON: None.

 

FINDINGS:  There is no acute fracture/dislocation evident of the elbow.  No abnormal fat pad signs ar
e seen.  The overlying soft tissue appears unremarkable.

 

IMPRESSION:  There is no acute fracture or dislocation of the elbow.

 

ICD 10 NO FRACTURE, INITIAL EVALUATION
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
## 2019-09-19 NOTE — XR
EXAMINATION TYPE: XR knee complete RT

 

DATE OF EXAM: 9/19/2019

 

CLINICAL HISTORY: pain

 

TECHNIQUE:  Three views of the right knee are obtained.

 

COMPARISON: None.

 

FINDINGS:  There is no acute fracture/dislocation.  The tri-compartment joint spaces appear severely 
narrowed. Meniscal chondrocalcinosis identified. The overlying soft tissue appears unremarkable.

 

IMPRESSION:  There is no acute fracture or dislocation.ICD 10 NO FRACTURE, INITIAL EVALUATION

## 2019-11-01 ENCOUNTER — HOSPITAL ENCOUNTER (OUTPATIENT)
Dept: HOSPITAL 47 - RADXRMAIN | Age: 69
Discharge: HOME | End: 2019-11-01
Attending: NEUROLOGICAL SURGERY
Payer: MEDICARE

## 2019-11-01 DIAGNOSIS — M43.13: Primary | ICD-10-CM

## 2019-11-01 DIAGNOSIS — M50.33: ICD-10-CM

## 2019-11-01 DIAGNOSIS — Z98.1: ICD-10-CM

## 2019-11-01 DIAGNOSIS — G95.89: ICD-10-CM

## 2019-11-01 DIAGNOSIS — S12.500D: ICD-10-CM

## 2019-11-01 PROCEDURE — 72052 X-RAY EXAM NECK SPINE 6/>VWS: CPT
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
## 2020-10-07 ENCOUNTER — HOSPITAL ENCOUNTER (EMERGENCY)
Dept: HOSPITAL 47 - EC | Age: 70
Discharge: HOME | End: 2020-10-07
Payer: MEDICARE

## 2020-10-07 VITALS
HEART RATE: 71 BPM | RESPIRATION RATE: 18 BRPM | DIASTOLIC BLOOD PRESSURE: 78 MMHG | TEMPERATURE: 98 F | SYSTOLIC BLOOD PRESSURE: 124 MMHG

## 2020-10-07 DIAGNOSIS — Z88.0: ICD-10-CM

## 2020-10-07 DIAGNOSIS — E86.0: ICD-10-CM

## 2020-10-07 DIAGNOSIS — G20: ICD-10-CM

## 2020-10-07 DIAGNOSIS — K59.89: ICD-10-CM

## 2020-10-07 DIAGNOSIS — Z91.048: ICD-10-CM

## 2020-10-07 DIAGNOSIS — Z79.899: ICD-10-CM

## 2020-10-07 DIAGNOSIS — Z79.82: ICD-10-CM

## 2020-10-07 DIAGNOSIS — K59.00: Primary | ICD-10-CM

## 2020-10-07 LAB
ALBUMIN SERPL-MCNC: 4.1 G/DL (ref 3.5–5)
ALP SERPL-CCNC: 77 U/L (ref 38–126)
ALT SERPL-CCNC: 6 U/L (ref 4–49)
ANION GAP SERPL CALC-SCNC: 4 MMOL/L
AST SERPL-CCNC: 28 U/L (ref 17–59)
BASOPHILS # BLD AUTO: 0 K/UL (ref 0–0.2)
BASOPHILS NFR BLD AUTO: 1 %
BUN SERPL-SCNC: 27 MG/DL (ref 9–20)
CALCIUM SPEC-MCNC: 9.1 MG/DL (ref 8.4–10.2)
CHLORIDE SERPL-SCNC: 105 MMOL/L (ref 98–107)
CO2 SERPL-SCNC: 29 MMOL/L (ref 22–30)
EOSINOPHIL # BLD AUTO: 0.2 K/UL (ref 0–0.7)
EOSINOPHIL NFR BLD AUTO: 3 %
ERYTHROCYTE [DISTWIDTH] IN BLOOD BY AUTOMATED COUNT: 4.72 M/UL (ref 4.3–5.9)
ERYTHROCYTE [DISTWIDTH] IN BLOOD: 12.4 % (ref 11.5–15.5)
GLUCOSE SERPL-MCNC: 105 MG/DL (ref 74–99)
HCT VFR BLD AUTO: 46.2 % (ref 39–53)
HGB BLD-MCNC: 15.4 GM/DL (ref 13–17.5)
LYMPHOCYTES # SPEC AUTO: 1.7 K/UL (ref 1–4.8)
LYMPHOCYTES NFR SPEC AUTO: 28 %
MCH RBC QN AUTO: 32.7 PG (ref 25–35)
MCHC RBC AUTO-ENTMCNC: 33.4 G/DL (ref 31–37)
MCV RBC AUTO: 97.9 FL (ref 80–100)
MONOCYTES # BLD AUTO: 0.3 K/UL (ref 0–1)
MONOCYTES NFR BLD AUTO: 5 %
NEUTROPHILS # BLD AUTO: 3.8 K/UL (ref 1.3–7.7)
NEUTROPHILS NFR BLD AUTO: 61 %
PH UR: 6 [PH] (ref 5–8)
PLATELET # BLD AUTO: 187 K/UL (ref 150–450)
POTASSIUM SERPL-SCNC: 4.6 MMOL/L (ref 3.5–5.1)
PROT SERPL-MCNC: 6.8 G/DL (ref 6.3–8.2)
SODIUM SERPL-SCNC: 138 MMOL/L (ref 137–145)
SP GR UR: 1.03 (ref 1–1.03)
UROBILINOGEN UR QL STRIP: 2 MG/DL (ref ?–2)
WBC # BLD AUTO: 6.1 K/UL (ref 3.8–10.6)

## 2020-10-07 PROCEDURE — 85025 COMPLETE CBC W/AUTO DIFF WBC: CPT

## 2020-10-07 PROCEDURE — 74018 RADEX ABDOMEN 1 VIEW: CPT

## 2020-10-07 PROCEDURE — 83690 ASSAY OF LIPASE: CPT

## 2020-10-07 PROCEDURE — 83605 ASSAY OF LACTIC ACID: CPT

## 2020-10-07 PROCEDURE — 93005 ELECTROCARDIOGRAM TRACING: CPT

## 2020-10-07 PROCEDURE — 81003 URINALYSIS AUTO W/O SCOPE: CPT

## 2020-10-07 PROCEDURE — 99284 EMERGENCY DEPT VISIT MOD MDM: CPT

## 2020-10-07 PROCEDURE — 84484 ASSAY OF TROPONIN QUANT: CPT

## 2020-10-07 PROCEDURE — 36415 COLL VENOUS BLD VENIPUNCTURE: CPT

## 2020-10-07 PROCEDURE — 80053 COMPREHEN METABOLIC PANEL: CPT

## 2020-10-07 PROCEDURE — 96360 HYDRATION IV INFUSION INIT: CPT

## 2020-10-07 NOTE — XR
EXAMINATION TYPE: XR KUB

 

DATE OF EXAM: 10/7/2020 6:17 PM

 

CLINICAL HISTORY:  Abdominal pain and abnormal stool

 

TECHNIQUE: Upright images of the abdomen and pelvis were obtained

 

COMPARISON: None.

 

FINDINGS: Scattered gas is seen in non-distended small bowel loops. Gas and fecal material is seen in
 non-distended colon. There is no visceromegaly, pneumoperitoneum, or abnormal calcification apprecia
jayce. Pelvic phleboliths. The lung bases are clear. Degenerative changes and dextroscoliosis of the althea
mbar spine. Degenerative changes of the hips.

 

IMPRESSION: 

Nonspecific bowel gas pattern.

## 2020-10-07 NOTE — ED
General Adult HPI





- General


Chief complaint: Recheck/Abnormal Lab/Rx


Stated complaint: GI issues


Time Seen by Provider: 10/07/20 17:06


Source: patient, RN notes reviewed, old records reviewed


Mode of arrival: ambulatory


Limitations: no limitations





- History of Present Illness


Initial comments: 


70-year-old male patient presents ED for evaluation of constipation.  Patient 

reports that he has long-term issues with constipation today when he was mowing 

the lawn he had a little bit of leakage of some greasy stools.  He denies any 

abdominal pain nausea vomiting or diarrhea.  Reports he has not had a bowel 

movement in a couple of days.  She reports no history of this morning he felt a 

little bit dizzy however that has since resolved.  Denies any chest pain.  

Denies any other acute complaints.





Systemic: Pt denies fatigue, fever/chills, rash. Pt denies weakness, night 

sweats, weight loss. 


Neuro: Pt denies headache, visual disturbances, syncope or pre-syncope.


HEENT: Pt denies ocular discharge or irritation, otalgia, rhinorrhea, 

pharyngitis or notable lymphadenopathy. 


Cardiopulmonary: Pt denies chest pain, SOB, heart palpitations, dyspnea on exert

ion.  


Abdominal/GI: Pt denies abdominal pain, n/v. 


: Pt denies dysuria, burning w/ urination, frequency/urgency. Denies new onset

urinary or bowel incontinence.  


MSK: Pt denies myalgia, loss of strength or function in extremities. 


Neuro: Pt denies new onset weakness, paresthesias. 








- Related Data


                                Home Medications











 Medication  Instructions  Recorded  Confirmed


 


amantadine HCL [Symmetrel] 100 mg PO BID 02/14/17 10/07/20


 


Aspirin 162 mg PO DAILY 09/19/19 10/07/20


 


Carbidopa-Levodopa  mg 1 tab PO DAILY PRN 10/07/20 10/07/20





[Sinemet  mg]   


 


Carbidopa/Levodopa/Entacapone 1 tab PO TID 10/07/20 10/07/20





[Carbidopa-Levodopa-Enta 200 mg]   


 


Donepezil HCl [Aricept] 10 mg PO DAILY 10/07/20 10/07/20


 


Polyethylene Glycol 3350 [Miralax] 17 gm PO DAILY 10/07/20 10/07/20


 


Tamsulosin HCl [Flomax] 0.4 mg PO W/SUPPER 10/07/20 10/07/20











                                    Allergies











Allergy/AdvReac Type Severity Reaction Status Date / Time


 


bee pollen Allergy  Unknown Verified 10/07/20 18:56


 


Penicillins AdvReac  Diarrhea Verified 10/07/20 18:56





   (C-Diff)  














Review of Systems


ROS Statement: 


Those systems with pertinent positive or pertinent negative responses have been 

documented in the HPI.





ROS Other: All systems not noted in ROS Statement are negative.





Past Medical History


Additional Past Medical History / Comment(s): parkinson's


History of Any Multi-Drug Resistant Organisms: C-DIFF


Date of last positivie culture/infection: 01/24/2017


MDRO Source:: "marginal" c-diff, pt states this is "all gone"


Past Surgical History: Orthopedic Surgery


Additional Past Surgical History / Comment(s): right knee sx, back sx, neck 

surgery


Past Psychological History: No Psychological Hx Reported


Smoking Status: Never smoker


Past Alcohol Use History: Occasional


Past Drug Use History: None Reported





General Exam





- General Exam Comments


Initial Comments: 





Constitutional: NAD, AOX3, Pt has pleasant affect. 


HEENT: NC/AT, trachea midline, neck supple, no lymphadenopathy. Posterior 

pharynx non erythematous, without exudates. External ears appear normal, without

 discharge. Mucous membranes moist. Eyes PERRLA, EOM intact. There is no scleral

 icterus. No pallor noted. 


Cardiopulmonary: RRR, no murmurs, rubs or gallops, no JVD noted. Lungs CTAB in 

anterior and posterior fields. No peripheral edema. 


Abdominal exam: Abdomen soft and non-distended. Abdomen non-tender to palpation 

in all 4 quadrants. Bowel sounds active in LLQ. No hepatosplenomegaly. No 

ecchymosis


Neuro: CN II-XII grossly intact. No nuchal rigidity. No raccon eyes, no york 

sign, no hemotympanum. No cervical spinal tenderness. 


MSK:  Full active ROM in upper and lower extremities, 5/5 stregnth. 








Limitations: no limitations





Course


                                   Vital Signs











  10/07/20





  16:58


 


Temperature 98.0 F


 


Pulse Rate 71


 


Respiratory 18





Rate 


 


Blood Pressure 124/78


 


O2 Sat by Pulse 97





Oximetry 














Medical Decision Making





- Medical Decision Making











70-year-old male patient presents to ED for evaluation.  Patient physical exam 

displayed nontender abdomen.  Laboratory this case.  Mild dehydration patient 

administered fluid bolus.  EKG is nonischemic.  KUB did display some gas and 

fecal material seen.  Disimpaction was attempted I was unable to disimpact.  I 

Therevac was performed patient had a large bowel movement.  We feet digital exam

 reveals empty rectal vault.  I believe that patient's leakage of stool was due 

to a large fecal ball.  Patient discharged to follow-up with his primary care 

provider and return here if any worsening symptoms.  Case discussed with Dr. Chacon. 




















- Lab Data


Result diagrams: 


                                 10/07/20 17:25





                                 10/07/20 17:25


                                   Lab Results











  10/07/20 10/07/20 10/07/20 Range/Units





  17:25 17:25 17:25 


 


WBC  6.1    (3.8-10.6)  k/uL


 


RBC  4.72    (4.30-5.90)  m/uL


 


Hgb  15.4    (13.0-17.5)  gm/dL


 


Hct  46.2    (39.0-53.0)  %


 


MCV  97.9    (80.0-100.0)  fL


 


MCH  32.7    (25.0-35.0)  pg


 


MCHC  33.4    (31.0-37.0)  g/dL


 


RDW  12.4    (11.5-15.5)  %


 


Plt Count  187    (150-450)  k/uL


 


Neutrophils %  61    %


 


Lymphocytes %  28    %


 


Monocytes %  5    %


 


Eosinophils %  3    %


 


Basophils %  1    %


 


Neutrophils #  3.8    (1.3-7.7)  k/uL


 


Lymphocytes #  1.7    (1.0-4.8)  k/uL


 


Monocytes #  0.3    (0-1.0)  k/uL


 


Eosinophils #  0.2    (0-0.7)  k/uL


 


Basophils #  0.0    (0-0.2)  k/uL


 


Sodium    138  (137-145)  mmol/L


 


Potassium    4.6  (3.5-5.1)  mmol/L


 


Chloride    105  ()  mmol/L


 


Carbon Dioxide    29  (22-30)  mmol/L


 


Anion Gap    4  mmol/L


 


BUN    27 H  (9-20)  mg/dL


 


Creatinine    1.12  (0.66-1.25)  mg/dL


 


Est GFR (CKD-EPI)AfAm    78  (>60 ml/min/1.73 sqM)  


 


Est GFR (CKD-EPI)NonAf    67  (>60 ml/min/1.73 sqM)  


 


Glucose    105 H  (74-99)  mg/dL


 


Plasma Lactic Acid [place]     (0.7-2.0)  mmol/L


 


Calcium    9.1  (8.4-10.2)  mg/dL


 


Total Bilirubin    1.7 H  (0.2-1.3)  mg/dL


 


AST    28  (17-59)  U/L


 


ALT    6  (4-49)  U/L


 


Alkaline Phosphatase    77  ()  U/L


 


Troponin I     (0.000-0.034)  ng/mL


 


Total Protein    6.8  (6.3-8.2)  g/dL


 


Albumin    4.1  (3.5-5.0)  g/dL


 


Lipase    91  ()  U/L


 


Urine Color   Dark Yellow   


 


Urine Appearance   Clear   (Clear)  


 


Urine pH   6.0   (5.0-8.0)  


 


Ur Specific Gravity   1.031   (1.001-1.035)  


 


Urine Protein   Trace H   (Negative)  


 


Urine Glucose (UA)   Negative   (Negative)  


 


Urine Ketones   Trace H   (Negative)  


 


Urine Blood   Negative   (Negative)  


 


Urine Nitrite   Negative   (Negative)  


 


Urine Bilirubin   Negative   (Negative)  


 


Urine Urobilinogen   2.0   (<2.0)  mg/dL


 


Ur Leukocyte Esterase   Negative   (Negative)  














  10/07/20 10/07/20 Range/Units





  17:25 17:25 


 


WBC    (3.8-10.6)  k/uL


 


RBC    (4.30-5.90)  m/uL


 


Hgb    (13.0-17.5)  gm/dL


 


Hct    (39.0-53.0)  %


 


MCV    (80.0-100.0)  fL


 


MCH    (25.0-35.0)  pg


 


MCHC    (31.0-37.0)  g/dL


 


RDW    (11.5-15.5)  %


 


Plt Count    (150-450)  k/uL


 


Neutrophils %    %


 


Lymphocytes %    %


 


Monocytes %    %


 


Eosinophils %    %


 


Basophils %    %


 


Neutrophils #    (1.3-7.7)  k/uL


 


Lymphocytes #    (1.0-4.8)  k/uL


 


Monocytes #    (0-1.0)  k/uL


 


Eosinophils #    (0-0.7)  k/uL


 


Basophils #    (0-0.2)  k/uL


 


Sodium    (137-145)  mmol/L


 


Potassium    (3.5-5.1)  mmol/L


 


Chloride    ()  mmol/L


 


Carbon Dioxide    (22-30)  mmol/L


 


Anion Gap    mmol/L


 


BUN    (9-20)  mg/dL


 


Creatinine    (0.66-1.25)  mg/dL


 


Est GFR (CKD-EPI)AfAm    (>60 ml/min/1.73 sqM)  


 


Est GFR (CKD-EPI)NonAf    (>60 ml/min/1.73 sqM)  


 


Glucose    (74-99)  mg/dL


 


Plasma Lactic Acid [place]   1.5  (0.7-2.0)  mmol/L


 


Calcium    (8.4-10.2)  mg/dL


 


Total Bilirubin    (0.2-1.3)  mg/dL


 


AST    (17-59)  U/L


 


ALT    (4-49)  U/L


 


Alkaline Phosphatase    ()  U/L


 


Troponin I  <0.012   (0.000-0.034)  ng/mL


 


Total Protein    (6.3-8.2)  g/dL


 


Albumin    (3.5-5.0)  g/dL


 


Lipase    ()  U/L


 


Urine Color    


 


Urine Appearance    (Clear)  


 


Urine pH    (5.0-8.0)  


 


Ur Specific Gravity    (1.001-1.035)  


 


Urine Protein    (Negative)  


 


Urine Glucose (UA)    (Negative)  


 


Urine Ketones    (Negative)  


 


Urine Blood    (Negative)  


 


Urine Nitrite    (Negative)  


 


Urine Bilirubin    (Negative)  


 


Urine Urobilinogen    (<2.0)  mg/dL


 


Ur Leukocyte Esterase    (Negative)  














- EKG Data


-: EKG Interpreted by Me


EKG Comments: 








 ventricular rate 56, UT interval 94 QRS 86, QT/QTc 460 since 443.  Sinus 

bradycardia, otherwise normal EKG.  No concern for acute ischemia this time.





Disposition


Clinical Impression: 


 Constipation





Disposition: HOME SELF-CARE


Condition: Stable


Instructions (If sedation given, give patient instructions):  Constipation (ED)


Additional Instructions: 


Follow up with PCP tomorrow. Continue your bowel regimen, increase fiber in 

diet. Return to ED with any worsening symptoms. 


Is patient prescribed a controlled substance at d/c from ED?: No


Referrals: 


Scottie Austin MD [Primary Care Provider] - 1-2 days

## 2020-12-11 ENCOUNTER — HOSPITAL ENCOUNTER (INPATIENT)
Dept: HOSPITAL 47 - 4SSUR | Age: 70
LOS: 5 days | Discharge: NURSING, REHAB OR LONG-TERM CARE | DRG: 522 | End: 2020-12-16
Attending: ORTHOPAEDIC SURGERY | Admitting: ORTHOPAEDIC SURGERY
Payer: MEDICARE

## 2020-12-11 DIAGNOSIS — Z86.19: ICD-10-CM

## 2020-12-11 DIAGNOSIS — Z20.828: ICD-10-CM
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
## 2020-12-11 DIAGNOSIS — Z98.890: ICD-10-CM

## 2020-12-11 DIAGNOSIS — Z79.82: ICD-10-CM

## 2020-12-11 DIAGNOSIS — S72.001A: Primary | ICD-10-CM

## 2020-12-11 DIAGNOSIS — Z88.0: ICD-10-CM

## 2020-12-11 DIAGNOSIS — Z91.030: ICD-10-CM

## 2020-12-11 DIAGNOSIS — N40.0: ICD-10-CM

## 2020-12-11 DIAGNOSIS — G20: ICD-10-CM

## 2020-12-11 DIAGNOSIS — R41.89: ICD-10-CM

## 2020-12-11 DIAGNOSIS — M19.91: ICD-10-CM

## 2020-12-11 DIAGNOSIS — Z79.899: ICD-10-CM

## 2020-12-11 DIAGNOSIS — R26.9: ICD-10-CM

## 2020-12-11 LAB
ALBUMIN SERPL-MCNC: 4.1 G/DL (ref 3.5–5)
ALBUMIN/GLOB SERPL: 1.5 {RATIO}
ALP SERPL-CCNC: 91 U/L (ref 38–126)
ALT SERPL-CCNC: 13 U/L (ref 4–49)
ANION GAP SERPL CALC-SCNC: 6 MMOL/L
APTT BLD: 22.7 SEC (ref 22–30)
AST SERPL-CCNC: 24 U/L (ref 17–59)
BASOPHILS # BLD AUTO: 0 K/UL (ref 0–0.2)
BASOPHILS NFR BLD AUTO: 0 %
BUN SERPL-SCNC: 29 MG/DL (ref 9–20)
CALCIUM SPEC-MCNC: 9.2 MG/DL (ref 8.4–10.2)
CHLORIDE SERPL-SCNC: 106 MMOL/L (ref 98–107)
CO2 SERPL-SCNC: 27 MMOL/L (ref 22–30)
EOSINOPHIL # BLD AUTO: 0 K/UL (ref 0–0.7)
EOSINOPHIL NFR BLD AUTO: 0 %
ERYTHROCYTE [DISTWIDTH] IN BLOOD BY AUTOMATED COUNT: 4.41 M/UL (ref 4.3–5.9)
ERYTHROCYTE [DISTWIDTH] IN BLOOD: 12.7 % (ref 11.5–15.5)
GLOBULIN SER CALC-MCNC: 2.7 G/DL
GLUCOSE SERPL-MCNC: 123 MG/DL (ref 74–99)
HCT VFR BLD AUTO: 42.6 % (ref 39–53)
HGB BLD-MCNC: 14.4 GM/DL (ref 13–17.5)
INR PPP: 1 (ref ?–1.2)
LYMPHOCYTES # SPEC AUTO: 0.9 K/UL (ref 1–4.8)
LYMPHOCYTES NFR SPEC AUTO: 8 %
MCH RBC QN AUTO: 32.6 PG (ref 25–35)
MCHC RBC AUTO-ENTMCNC: 33.7 G/DL (ref 31–37)
MCV RBC AUTO: 96.6 FL (ref 80–100)
MONOCYTES # BLD AUTO: 0.8 K/UL (ref 0–1)
MONOCYTES NFR BLD AUTO: 7 %
NEUTROPHILS # BLD AUTO: 9.4 K/UL (ref 1.3–7.7)
NEUTROPHILS NFR BLD AUTO: 83 %
PLATELET # BLD AUTO: 221 K/UL (ref 150–450)
POTASSIUM SERPL-SCNC: 4.3 MMOL/L (ref 3.5–5.1)
PROT SERPL-MCNC: 6.8 G/DL (ref 6.3–8.2)
PT BLD: 10.8 SEC (ref 9–12)
SODIUM SERPL-SCNC: 139 MMOL/L (ref 137–145)
WBC # BLD AUTO: 11.2 K/UL (ref 3.8–10.6)

## 2020-12-11 PROCEDURE — 87635 SARS-COV-2 COVID-19 AMP PRB: CPT

## 2020-12-11 PROCEDURE — 80053 COMPREHEN METABOLIC PANEL: CPT

## 2020-12-11 PROCEDURE — 88305 TISSUE EXAM BY PATHOLOGIST: CPT

## 2020-12-11 PROCEDURE — 73501 X-RAY EXAM HIP UNI 1 VIEW: CPT

## 2020-12-11 PROCEDURE — 93005 ELECTROCARDIOGRAM TRACING: CPT

## 2020-12-11 PROCEDURE — 85730 THROMBOPLASTIN TIME PARTIAL: CPT

## 2020-12-11 PROCEDURE — 88311 DECALCIFY TISSUE: CPT

## 2020-12-11 PROCEDURE — 85025 COMPLETE CBC W/AUTO DIFF WBC: CPT

## 2020-12-11 PROCEDURE — 71045 X-RAY EXAM CHEST 1 VIEW: CPT

## 2020-12-11 PROCEDURE — 85610 PROTHROMBIN TIME: CPT

## 2020-12-11 RX ADMIN — HYDROCODONE BITARTRATE AND ACETAMINOPHEN PRN EACH: 5; 325 TABLET ORAL at 21:41

## 2020-12-11 RX ADMIN — DOCUSATE SODIUM AND SENNOSIDES SCH EACH: 50; 8.6 TABLET ORAL at 21:41

## 2020-12-11 NOTE — P.HPOR
History of Present Illness


H&P Date: 12/11/20


Chief Complaint: Right hip fracture


Patient is a 70 year old male seen in office today 12/11/20. Patient returns 

today for worsening right hip pain over the past week. He's had no new injury. 

However he has had worsening pain with ambulating weightbearing. He denies 

radicular symptoms including numbness or tingling. He has no other acute 

complaints. 





Prior history: 


The patient is a 70 -year old right-hand dominant male with a medical history 

significant for Parkinson's disease who presents today for evaluation of his 

right hip and right elbow. He describes an injury today, 12/01/20 when he fell. 

The patient rates his hip pain today in the office 5/10 and finds his symptoms 

are worse with standing and walking and his elbow pain is 5/10 and worse with 

lifting. He describes pain with weight bearing on the right hip. 0"


The patient describe the injury as: fell on hip and elbow. The date of injury 

was 12/01/2020. The patient is right hand dominant. Patient states his pain 

level is at 5/10. He continues to have pain. He describes his pain as sharp. The

pain is intermittent. Patient states his symptoms are worse with standing and 

walking. Patient does not work outside of his home. The patient has had 

treatment for his symptoms including cane.








Review of Systems


All systems: negative


Constitutional: Denies chills, Denies fever


Eyes: denies blurred vision, denies pain


Ears, nose, mouth and throat: Denies headache, Denies sore throat


Cardiovascular: Denies chest pain, Denies shortness of breath


Respiratory: Denies cough


Gastrointestinal: Denies abdominal pain, Denies diarrhea, Denies nausea, Denies 

vomiting


Musculoskeletal: Denies myalgias


Integumentary: Denies pruritus, Denies rash


Neurological: Denies numbness, Denies weakness


Psychiatric: Denies anxiety, Denies depression


Endocrine: Denies fatigue, Denies weight change





Past Medical History


Additional Past Medical History / Comment(s): parkinson's


History of Any Multi-Drug Resistant Organisms: C-DIFF


Date of last positivie culture/infection: 01/24/2017


MDRO Source:: "marginal" c-diff, pt states this is "all gone"


Past Surgical History: Orthopedic Surgery


Additional Past Surgical History / Comment(s): right knee sx, back sx, neck 

surgery


Past Psychological History: No Psychological Hx Reported


Smoking Status: Never smoker


Past Alcohol Use History: Occasional


Past Drug Use History: None Reported





Medications and Allergies


                                Home Medications











 Medication  Instructions  Recorded  Confirmed  Type


 


amantadine HCL [Symmetrel] 100 mg PO BID 02/14/17 10/07/20 History


 


Aspirin 162 mg PO DAILY 09/19/19 10/07/20 History


 


Carbidopa-Levodopa  mg 1 tab PO DAILY PRN 10/07/20 10/07/20 History





[Sinemet  mg]    


 


Carbidopa/Levodopa/Entacapone 1 tab PO TID 10/07/20 10/07/20 History





[Carbidopa-Levodopa-Enta 200 mg]    


 


Donepezil HCl [Aricept] 10 mg PO DAILY 10/07/20 10/07/20 History


 


Polyethylene Glycol 3350 [Miralax] 17 gm PO DAILY 10/07/20 10/07/20 History


 


Tamsulosin HCl [Flomax] 0.4 mg PO W/SUPPER 10/07/20 10/07/20 History








                                    Allergies











Allergy/AdvReac Type Severity Reaction Status Date / Time


 


bee pollen Allergy  Unknown Verified 10/07/20 18:56


 


Penicillins AdvReac  Diarrhea Verified 10/07/20 18:56





   (C-Diff)  














Physical Examination


Right Hip Examination


On inspection there are no wounds erythema or ecchymosis to the right hip. 


Range of motion was not tested due to the fracture. 


Calf is soft nontender. 


Motor and sensation is intact throughout the lower extremity. 





Full inversion, eversion, plantar flexion and dorsiflexion of the foot. Intact 

sensation at the lateral, medial and plantar first dorsal web spaces. There is 

2+ posterior tibial pulse with brisk capillary refill in all digits. 








Results


Xrays in office today show nondisplaced subcapital femoral neck fracture








Assessment and Plan


(1) Closed right hip fracture


Narrative/Plan: 


Patient has been seen by Dr. Rolon. He has recommended direct admission with 

plan for surgical intervention including right hip hemiarthroplasty for his 

right hip fracture. He is being admitted to the hospital today and we will 

request preoperative clearance and medical management. Case will be boarded for 

tomorrow with plan for 7am, 12/12/20. He is to be nothing by mouth after 

midnight. He will need discharge planning and possible placement. 


Status: Acute   Priority: Medium   Code(s): S72.001A - FRACTURE OF UNSP PART OF 

NECK OF RIGHT FEMUR, INIT   SNOMED Code(s): 401280043


   


Time with Patient: Less than 30

## 2020-12-12 LAB
BASOPHILS # BLD AUTO: 0 K/UL (ref 0–0.2)
BASOPHILS NFR BLD AUTO: 0 %
EOSINOPHIL # BLD AUTO: 0 K/UL (ref 0–0.7)
EOSINOPHIL NFR BLD AUTO: 0 %
ERYTHROCYTE [DISTWIDTH] IN BLOOD BY AUTOMATED COUNT: 4.28 M/UL (ref 4.3–5.9)
ERYTHROCYTE [DISTWIDTH] IN BLOOD: 12.8 % (ref 11.5–15.5)
HCT VFR BLD AUTO: 41.3 % (ref 39–53)
HGB BLD-MCNC: 13.8 GM/DL (ref 13–17.5)
LYMPHOCYTES # SPEC AUTO: 1.4 K/UL (ref 1–4.8)
LYMPHOCYTES NFR SPEC AUTO: 15 %
MCH RBC QN AUTO: 32.3 PG (ref 25–35)
MCHC RBC AUTO-ENTMCNC: 33.5 G/DL (ref 31–37)
MCV RBC AUTO: 96.4 FL (ref 80–100)
MONOCYTES # BLD AUTO: 0.8 K/UL (ref 0–1)
MONOCYTES NFR BLD AUTO: 8 %
NEUTROPHILS # BLD AUTO: 7.2 K/UL (ref 1.3–7.7)
NEUTROPHILS NFR BLD AUTO: 75 %
PLATELET # BLD AUTO: 201 K/UL (ref 150–450)
WBC # BLD AUTO: 9.5 K/UL (ref 3.8–10.6)

## 2020-12-12 PROCEDURE — 0SRR0JA REPLACEMENT OF RIGHT HIP JOINT, FEMORAL SURFACE WITH SYNTHETIC SUBSTITUTE, UNCEMENTED, OPEN APPROACH: ICD-10-PCS

## 2020-12-12 RX ADMIN — POTASSIUM CHLORIDE SCH MLS/HR: 14.9 INJECTION, SOLUTION INTRAVENOUS at 13:03

## 2020-12-12 RX ADMIN — ENTACAPONE SCH MG: 200 TABLET, FILM COATED ORAL at 12:57

## 2020-12-12 RX ADMIN — HYDROCODONE BITARTRATE AND ACETAMINOPHEN PRN EACH: 5; 325 TABLET ORAL at 17:45

## 2020-12-12 RX ADMIN — THERA TABS SCH EACH: TAB at 14:46

## 2020-12-12 RX ADMIN — CARBIDOPA AND LEVODOPA SCH EACH: 25; 100 TABLET ORAL at 21:10

## 2020-12-12 RX ADMIN — DONEPEZIL HYDROCHLORIDE SCH MG: 10 TABLET ORAL at 14:46

## 2020-12-12 RX ADMIN — TAMSULOSIN HYDROCHLORIDE SCH MG: 0.4 CAPSULE ORAL at 17:16

## 2020-12-12 RX ADMIN — ASPIRIN 81 MG CHEWABLE TABLET SCH MG: 81 TABLET CHEWABLE at 14:46

## 2020-12-12 RX ADMIN — CARBIDOPA AND LEVODOPA SCH EACH: 25; 100 TABLET ORAL at 14:47

## 2020-12-12 RX ADMIN — CARBIDOPA AND LEVODOPA SCH: 25; 100 TABLET ORAL at 13:00

## 2020-12-12 RX ADMIN — ENTACAPONE SCH MG: 200 TABLET, FILM COATED ORAL at 21:10

## 2020-12-12 RX ADMIN — DOCUSATE SODIUM AND SENNOSIDES SCH EACH: 50; 8.6 TABLET ORAL at 21:09

## 2020-12-12 RX ADMIN — ENTACAPONE SCH: 200 TABLET, FILM COATED ORAL at 15:15

## 2020-12-12 NOTE — XR
EXAMINATION TYPE: XR Hip Limited RT

 

DATE OF EXAM: 12/12/2020

 

COMPARISON: None

 

HISTORY: Post hip surgery

 

TECHNIQUE: AP right hip is obtained following prosthesis placement

 

FINDINGS: No acute fractures are evident. Right hip prosthesis is present. Femoral component articula
milton with the acetabulum. Postsurgical soft tissue changes are evident

 

IMPRESSION:

1.  No acute fracture post right hip replacement

## 2020-12-12 NOTE — P.CONS
History of Present Illness





- Reason for Consult


Consult date: 12/12/20


Medical managed


Requesting physician: Antwon Rolon





- Chief Complaint


Hip pain





- History of Present Illness





Consultation:


This is a pleasant 70-year-old patient was chronic stable medical conditions 

include osteoarthritis, Parkinson's disease, kidney dysfunction.  Patient was 

seen in Dr. Rolon's office on December 11 of the return to the office for 

worsening right hip pain.  No new injury.  He had fallen on December 1 and was 

having some pain in his right ear.  And it was worse with any kind of activity. 

Most of lifting.  Patient was then admitted.  Patient does not have any chest 

pain or shortness of breath.  And has a rather limited excess tolerance.  No 

cardiac history.  I had treated the patient for surgery earlier.  Patient 

underwent a right hip hemiarthroplasty for displaced right femoral neck 

fracture.





Review of systems:


GEN.:  Tired


EYES: None


HEENT: None


NECK: None


RESPIRATORY: None


CARDIOVASCULAR: None


GASTROINTESTINAL: None


GENITOURINARY: None


MUSCULOSKELETAL: Joint pains


LYMPHATICS: None


HEMATOLOGICAL: None  


PSYCHIATRY: None


NEUROLOGICAL: Tremors.





Past medical history to include:


Osteoarthritis, Parkinson, gait dysfunction, BPH, cognitive impairment





Social history:


No history of smoking.  Alcohol occasional.





Physical examination:


VITAL SIGNS: 98, 61, 20, 107/62, 96% on 8 L.  Previously 96% on room air


GENERAL: BMI 25.4, laying in bed, awake.


EYES: Pupils equal.  Conjunctiva normal.


HEENT: External appearance of nose and ears normal, oral cavity grossly normal.


NECK: JVD not raised; masses not palpable.


HEART: First and second heart sounds are normal;  no edema.  


LUNGS: Respiratory rate normal; clear to auscultation.  


ABDOMEN: Soft,  nontender, liver spleen not palpable, no masses palpable.  


PSYCH: [Able to answer some simple questions l. 


MUSCULAR skeletal: Evidence of OA.  Dressing over the right hip 


NEUROLOGICAL: [Cranial nerves grossly intact; slow movements and some tremors 

LYMPHATICS: No lymph nodes palpable in the axilla and neck





INVESTIGATIONS, reviewed in the clinical context:


White count 12.2 hemoglobin 14.4 platelets 221 potassium 4.3 creatinine 1.04


Chest x-ray film personally reviewed by me-some chronic changes possible 

fibrotic





Assessment:


-8 is a patient took a fall on December 1 still having pain in the right hip.  

Return to Dr. Rolon's office.  Admitted after a fracture was discovered.  No s

tatus post right hip hemiarthroplasty.


-Primary osteoarthritis


-Idiopathic Parkinson disorder


-Chronic gait dysfunction


-Cognitive impairment from Parkinson disease





Plan:


Patient status post right hip repair.  Home medications resumed.  IV Ancef for 

perioperative infection control.  Patient is on aspirin for DVT prophylaxis.  

Care was discussed with the patient.  Getting IV fluids.


Thank you Dr. Rolon





Past Medical History


Past Medical History: Musculoskeletal Disorder


Additional Past Medical History / Comment(s): Parkinson's


History of Any Multi-Drug Resistant Organisms: C-DIFF


Year Discovered:: 01/24/2017


MDRO Source:: "marginal" c-diff, pt states this is "all gone"


Past Surgical History: Orthopedic Surgery


Additional Past Surgical History / Comment(s): right knee sx, back sx, neck 

surgery


Past Psychological History: No Psychological Hx Reported


Smoking Status: Never smoker


Past Alcohol Use History: Occasional


Past Drug Use History: None Reported





Medications and Allergies


                                Home Medications











 Medication  Instructions  Recorded  Confirmed  Type


 


amantadine HCL [Symmetrel] 100 mg PO BID 02/14/17 12/11/20 History


 


Aspirin 162 mg PO DAILY 09/19/19 12/11/20 History


 


Carbidopa-Levodopa  mg 1 tab PO DAILY PRN 10/07/20 12/11/20 History





[Sinemet  mg]    


 


Carbidopa/Levodopa/Entacapone 1 tab PO TID 10/07/20 12/11/20 History





[Carbidopa-Levodopa-Enta 200 mg]    


 


Donepezil HCl [Aricept] 10 mg PO DAILY 10/07/20 12/11/20 History


 


Polyethylene Glycol 3350 [Miralax] 17 gm PO DAILY 10/07/20 12/11/20 History


 


Tamsulosin HCl [Flomax] 0.4 mg PO W/SUPPER 10/07/20 12/11/20 History








                                    Allergies











Allergy/AdvReac Type Severity Reaction Status Date / Time


 


bee pollen Allergy  Unknown Verified 12/11/20 20:29


 


Penicillins AdvReac  Diarrhea Verified 12/11/20 20:29





   (C-Diff)  














Physical Exam


Vitals: 


                                   Vital Signs











  Temp Pulse Pulse Resp BP Pulse Ox


 


 12/12/20 12:04    80  20  104/52  100


 


 12/12/20 11:48  98 F   61  20  107/62  96


 


 12/12/20 08:00     16  


 


 12/12/20 07:43  97.7 F   65  16  129/69  97


 


 12/12/20 02:10  98.6 F    16  167/74  96


 


 12/11/20 20:10  98.3 F  79   16  152/76  97


 


 12/11/20 20:00     16  








                                Intake and Output











 12/11/20 12/12/20 12/12/20





 22:59 06:59 14:59


 


Intake Total   950


 


Output Total  800 400


 


Balance  -800 550


 


Intake:   


 


  IV   950


 


Output:   


 


  Urine  800 300


 


  Estimated Blood Loss   100


 


Other:   


 


  Voiding Method Indwelling Catheter  Indwelling Catheter


 


  Weight 99.7 kg  














Results


CBC & Chem 7: 


                                 12/12/20 08:25





                                 12/11/20 19:08


Labs: 


                  Abnormal Lab Results - Last 24 Hours (Table)











  12/11/20 12/11/20 12/12/20 Range/Units





  19:08 19:09 08:25 


 


WBC   11.2 H   (3.8-10.6)  k/uL


 


RBC    4.28 L  (4.30-5.90)  m/uL


 


Neutrophils #   9.4 H   (1.3-7.7)  k/uL


 


Lymphocytes #   0.9 L   (1.0-4.8)  k/uL


 


BUN  29 H    (9-20)  mg/dL


 


Glucose  123 H    (74-99)  mg/dL


 


Total Bilirubin  1.9 H    (0.2-1.3)  mg/dL

## 2020-12-12 NOTE — OP
OPERATIVE REPORT



DATE OF PROCEDURE:

12/12/2020.



SURGEON:

Antwon Rolon M.D.



ASSISTANT:

Nahid SANTANA.



PREOPERATIVE DIAGNOSIS:

Right displaced femoral neck fracture.



POSTOP DIAGNOSIS:

Right displaced femoral neck fracture.



PROCEDURE PERFORMED:

Right hip hemiarthroplasty.



ANESTHESIA:

Spinal with sedation.



ESTIMATED BLOOD LOSS:

100 mL.



TOURNIQUET:

None.



DRAINS:

None.



COMPLICATIONS:

None apparent.



DISPOSITION:

Postanesthesia care unit.



INDICATIONS:

Chase is a very pleasant 70-year-old male who presented to my office late yesterday

afternoon with right hip pain.  Workup including x-rays revealed a mildly displaced

subcapital femoral neck fracture.  Chase does have a medical history of advanced

Parkinson disease as well.  He was admitted to the hospital from my office yesterday.

He was cleared for surgery and we made a decision to proceed with surgery this morning

for his right hip.



The risks of procedure were discussed with him and his wife in detail.  These risks

include, but are not limited to risk of infection, nerve damage, bleeding, pain, and a

small risk of deep vein thrombosis which could lead to fatal pulmonary embolism.

Further risks include lack of instability in the hip and periprosthetic fracture.  All

of Chase's and his wife's questions were answered to their satisfaction.  Appropriate

informed consent was obtained.



DESCRIPTION OF THE PROCEDURE:

The patient identified in preop holding area.  Surgical site was marked by both the

patient and myself.  He was given 2 g of Ancef IV for prophylactic purposes.  He was

then transferred to the operative suite. He was placed supine on the operative table.

A spinal anesthetic was then administered, dosed per the anesthesia without apparent

complication.  He was then placed into the left lateral decubitus position well-padded

in preparation for surgery.  Great care was taken to ensure that he had a well-padded

axillary roll placed as well and his legs were appropriately padded as well.



The patient's right lower extremity was then prepped and draped in usual sterile

fashion.  Standard surgical pause undertaken to ensure that we were operating on the

correct site and that appropriate preoperative antibiotics were given.  All staff were

in agreement and we proceeded.



The outlines of the greater trochanter and proximal femur were marked surgical pen.  A

planned 12 to 15 cm incision based off the tip of the greater trochanter in line with

the shaft of the femur was then marked surgical pen.



The incision was then made with a 10 blade scalpel.  Dissection carried down sharply to

the tensor fascia.  Great care was taken to ensure we maintain thick flaps for closure

at the end of the case.  Hemostasis was achieved with electrocautery.



The tensor fascia was then incised in line with the incision.  This exposed the

underlying gluteus medius musculature.



The raphae between the anterior 1/3 posterior 2/3 of the gluteus medius was identified.

I then performed an anterolateral Hardinge type approach to the hip.  The gluteus

medius, gluteus minimus and anterior capsule were taken off in a sleeve and anteriorly.

The femoral neck fracture was really then identified.  I then utilized the guide to

melissa a freshened femoral neck cut.  A fresh femoral neck cut was then made with the

reciprocating saw.



I then removed the native femoral head with the corkscrew device.  The native femoral

head was then measured and measured 60 mm.  A 60 mm trial was then placed on the

lollipop.  It was placed into the acetabulum.  It had a good suction fit.



I then proceeded with preparation of the proximal femur.  The leg was flexed and then

externally rotated.  This gave me good exposure to the proximal femur.  The 

was utilized to gain access to the proximal femur.  I then started with the starting

reamer and then incrementally reamed the proximal femur up to a size 13.  I then

proceeded to broach the proximal femur.  I started with a size 8 broach and

incrementally increased up to a size 13 broach.  Broaching was done in approximately 10-

15 degrees of anteversion.  The size 13 broach fit very tightly.  I started with a -6

neck and a 60 monopolar head and then trialed.  We then progressively trialed up to a

neutral neck with a 60 monopolar head.  The hip was very stable with a neutral neck.

It was taken through full range of motion.  There was no instability.  There was very

minimal Shuck.  The hip was then carefully then redislocated.  The trial components

were removed.  I had the representative opened a Biomet Bimetric 13 collared hip stem,

a standard neck and a 60 monopolar head.  The real stem was then impacted into the

proximal femur.  Again this was done in approximately 10-15 degrees of anteversion.

There was a very good press fit.  The trunnion was then dried and the size 60 monopolar

head with a standard neck was then impacted onto the dried trunnion.  The hip was then

reduced.  Again it was very stable.  Taken through full range of motion.  We then

proceeded with closure.



The hip was then thoroughly irrigated with sterile saline solution and antibiotic

added.  The gluteus medius, minimus and anterior capsule were repaired back to the

greater trochanter with #5 Ethibond interrupted transosseous suture.  The raphae

between the anterior third and posterior 2/3 of the gluteus medius was closed with #1

Vicryl interrupted suture.  The tensor fascia was then repaired with #2 running Quill

suture.  The wound was again thoroughly irrigated with sterile saline solution with

antibiotic added.  Subcutaneous tissue closed with 2-0 Vicryl interrupted suture.  The

skin was closed with a running 3-0 Quill suture.  Dermabond was applied to the

incision.  Sterile compressive dressing was then applied and the patient's lower

extremities were placed into a hip abduction pillow.



All sponge and needle counts were deemed correct prior to closure.  The patient

tolerated the procedure without apparent complication.  He was transferred recovery

room in stable condition.





MMODL / IJN: [id number] / Job#: [id number]

## 2020-12-12 NOTE — XR
EXAMINATION TYPE: XR chest 1V portable

 

DATE OF EXAM: 12/12/2020

 

COMPARISON: 12/14/2017

 

INDICATION: Preop clearance

 

TECHNIQUE: Single frontal view of the chest is obtained.

 

FINDINGS:  

The heart size is normal.  

The pulmonary vasculature is normal.  

Minimal nonspecific right lower lung field infiltrate may be present. This is nonspecific. Subsegment
al atelectasis or atypical pneumonia could be considered. This appears to been interval change from c
omparison.  

 

 

IMPRESSION:  

1. Minimal right lower lobe infiltrate may be present. Correlate for atelectasis or atypical pneumoni
a.

## 2020-12-13 LAB
BASOPHILS # BLD AUTO: 0 K/UL (ref 0–0.2)
BASOPHILS NFR BLD AUTO: 0 %
EOSINOPHIL # BLD AUTO: 0 K/UL (ref 0–0.7)
EOSINOPHIL NFR BLD AUTO: 0 %
ERYTHROCYTE [DISTWIDTH] IN BLOOD BY AUTOMATED COUNT: 4.24 M/UL (ref 4.3–5.9)
ERYTHROCYTE [DISTWIDTH] IN BLOOD: 12.6 % (ref 11.5–15.5)
HCT VFR BLD AUTO: 41 % (ref 39–53)
HGB BLD-MCNC: 13.5 GM/DL (ref 13–17.5)
LYMPHOCYTES # SPEC AUTO: 1 K/UL (ref 1–4.8)
LYMPHOCYTES NFR SPEC AUTO: 9 %
MCH RBC QN AUTO: 31.7 PG (ref 25–35)
MCHC RBC AUTO-ENTMCNC: 32.8 G/DL (ref 31–37)
MCV RBC AUTO: 96.6 FL (ref 80–100)
MONOCYTES # BLD AUTO: 0.9 K/UL (ref 0–1)
MONOCYTES NFR BLD AUTO: 9 %
NEUTROPHILS # BLD AUTO: 8.4 K/UL (ref 1.3–7.7)
NEUTROPHILS NFR BLD AUTO: 80 %
PLATELET # BLD AUTO: 185 K/UL (ref 150–450)
WBC # BLD AUTO: 10.4 K/UL (ref 3.8–10.6)

## 2020-12-13 RX ADMIN — THERA TABS SCH EACH: TAB at 12:54

## 2020-12-13 RX ADMIN — ENTACAPONE SCH MG: 200 TABLET, FILM COATED ORAL at 17:21

## 2020-12-13 RX ADMIN — TAMSULOSIN HYDROCHLORIDE SCH MG: 0.4 CAPSULE ORAL at 17:21

## 2020-12-13 RX ADMIN — POTASSIUM CHLORIDE SCH: 14.9 INJECTION, SOLUTION INTRAVENOUS at 00:19

## 2020-12-13 RX ADMIN — ENTACAPONE SCH MG: 200 TABLET, FILM COATED ORAL at 09:20

## 2020-12-13 RX ADMIN — CARBIDOPA AND LEVODOPA SCH EACH: 25; 100 TABLET ORAL at 17:21

## 2020-12-13 RX ADMIN — CARBIDOPA AND LEVODOPA SCH EACH: 25; 100 TABLET ORAL at 21:19

## 2020-12-13 RX ADMIN — DONEPEZIL HYDROCHLORIDE SCH MG: 10 TABLET ORAL at 08:28

## 2020-12-13 RX ADMIN — ENTACAPONE SCH MG: 200 TABLET, FILM COATED ORAL at 21:19

## 2020-12-13 RX ADMIN — ASPIRIN 81 MG CHEWABLE TABLET SCH MG: 81 TABLET CHEWABLE at 08:28

## 2020-12-13 RX ADMIN — DOCUSATE SODIUM AND SENNOSIDES SCH EACH: 50; 8.6 TABLET ORAL at 21:18

## 2020-12-13 RX ADMIN — CARBIDOPA AND LEVODOPA SCH EACH: 25; 100 TABLET ORAL at 08:28

## 2020-12-13 NOTE — P.PN
Progress Note - Text


Progress Note Date: 12/13/20





- Chief Complaint


Hip pain








Consultation:


This is a pleasant 70-year-old patient was chronic stable medical conditions 

include osteoarthritis, Parkinson's disease, kidney dysfunction.  Patient was 

seen in Dr. Rolon's office on December 11 of the return to the office for 

worsening right hip pain.  No new injury.  He had fallen on December 1 and was 

having some pain in his right ear.  And it was worse with any kind of activity. 

Most of lifting.  Patient was then admitted.  Patient does not have any chest 

pain or shortness of breath.  And has a rather limited excess tolerance.  No 

cardiac history.  I had treated the patient for surgery earlier.  Patient 

underwent a right hip hemiarthroplasty for displaced right femoral neck 

fracture.


Today-sitting up in bed.  Pain control.  Eating well.





Review of systems: Was done for constitutional, cardiovascular, GI, pulmonary. 

relevant finding as above





Active Medications





Acetaminophen (Acetaminophen Tab 325 Mg Tab)  650 mg PO Q4HR PRN


   PRN Reason: Pain Scale 1 to 5


Hydrocodone Bitart/Acetaminophen (Hydrocodone/Apap 5-325mg 1 Each Tab)  1 each 

PO Q6HR PRN


   PRN Reason: Pain Scale 1 to 5


   Last Admin: 12/12/20 17:45 Dose:  1 each


   Documented by: 


Hydrocodone Bitart/Acetaminophen (Hydrocodone/Apap 10-325mg 1 Each Tab)  1 each 

PO Q6H PRN


   PRN Reason: Pain Scale 6 to 10


Amantadine HCl (Amantadine Hcl 100 Mg Cap)  100 mg PO BID [facility]


   Last Admin: 12/13/20 09:20 Dose:  100 mg


   Documented by: 


Aspirin (Aspirin 81 Mg)  162 mg PO DAILY [facility]


   Last Admin: 12/13/20 08:28 Dose:  162 mg


   Documented by: 


Carbidopa/Levodopa (Carbidopa-Levodopa  Mg 1 Each Tab)  1 each PO DAILY 

PRN


   PRN Reason: Parkinsonism


Carbidopa/Levodopa (Carbidopa-Levodopa  Mg 1 Each Tab)  2 each PO TID [facility]


   Last Admin: 12/13/20 17:21 Dose:  2 each


   Documented by: 


Diazepam (Diazepam 5 Mg Tab)  2.5 mg PO Q8HR PRN


   PRN Reason: Mild Spasms


Donepezil HCl (Donepezil 10 Mg Tab)  10 mg PO DAILY [facility]


   Last Admin: 12/13/20 08:28 Dose:  10 mg


   Documented by: 


Entacapone (Entacapone 200 Mg Tab)  200 mg PO TID [facility]


   Last Admin: 12/13/20 17:21 Dose:  200 mg


   Documented by: 


Hydromorphone HCl (Hydromorphone 0.2 Mg/1 Ml Syringe)  0.2 mg IVP Q3HR PRN


   PRN Reason: Pain Scale 4 to 6


Hydromorphone HCl (Hydromorphone 0.5 Mg/0.5 Ml Syringe)  0.125 mg IVP Q3HR PRN


   PRN Reason: Pain Scale 1 to 3


Hydromorphone HCl (Hydromorphone 0.5 Mg/0.5 Ml Syringe)  0.5 mg IVP Q3HR PRN


   PRN Reason: Pain Scale 7 to 10


Lactated Ringer's (Lactated Ringers)  1,000 mls @ 100 mls/hr IV .Q10H [facility]


   Last Admin: 12/13/20 00:19 Dose:  Not Given


   Documented by: 


Magnesium Hydroxide (Magnesium Hydroxide 2,400 Mg/10 Ml Cup)  2,400 mg PO DAILY 

PRN


   PRN Reason: Constipation


Multivitamins (Multivitamins, Thera 1 Each Tab)  1 each PO DAILY@1200 [facility]


   Last Admin: 12/13/20 12:54 Dose:  1 each


   Documented by: 


Naloxone HCl (Naloxone 0.4 Mg/Ml 1 Ml Vial)  0.2 mg IV Q2M PRN


   PRN Reason: Opioid Reversal


Ondansetron HCl (Ondansetron 4 Mg/2 Ml Vial)  4 mg IVP Q8HR PRN


   PRN Reason: Nausea And Vomiting


Polyethylene Glycol (Polyethylene Glycol 3350 17 Gm Powd.Pack)  17 gm PO DAILY 

[facility]


   Last Admin: 12/13/20 09:20 Dose:  17 gm


   Documented by: 


Senna/Docusate Sodium (Sennosides-Docusate Sodium 1 Each Tab)  2 each PO HS [facility]


   Last Admin: 12/12/20 21:09 Dose:  2 each


   Documented by: 


Tamsulosin HCl (Tamsulosin 0.4 Mg Cap.Er.24h)  0.4 mg PO W/SUPPER [facility]


   Last Admin: 12/13/20 17:21 Dose:  0.4 mg


   Documented by: 


Temazepam (Temazepam 15 Mg Cap)  15 mg PO HS PRN


   PRN Reason: Insomnia


Tramadol HCl (Tramadol 50 Mg Tab)  50 mg PO Q6HR PRN


   PRN Reason: Pain Scale 1 to 5











Physical examination:


VITAL SIGNS: 97.9, 84, 16, 137/72, 97% room air


GENERAL: BMI 25.4, sitting up in bed, comfortable


EYES: Pupils equal.  Conjunctiva normal.


NECK: JVD not raised; masses not palpable.


HEART: First and second heart sounds are normal;  no edema.  


LUNGS: Respiratory rate normal; clear to auscultation.  


ABDOMEN: Soft,  nontender, liver spleen not palpable, no masses palpable.  


PSYCH: Able to answer some simple questions. 


MUSCULAR skeletal: Evidence of OA.  Dressing over the right hip 


NEUROLOGICAL: Bradykinesia and some tremors





INVESTIGATIONS, reviewed in the clinical context:


December 13: White count 10.4 hemoglobin 13.5


White count 12.2 hemoglobin 14.4 platelets 221 potassium 4.3 creatinine 1.04


Chest x-ray film personally reviewed by me-some chronic changes possible 

fibrotic





Assessment:


- mechanical right femur neck fracture.-right hip hemiarthroplasty.


-Primary osteoarthritis


-Idiopathic Parkinson disorder


-Chronic gait dysfunction


-Cognitive impairment from Parkinson disease





Plan:


Continue current medication treatment plan.  The CLL.  Discussed with patient.


Thank you Dr. Rolon

## 2020-12-13 NOTE — P.PN
Progress Note - Text


Progress Note Date: 12/13/20





Orthopedics:





History of present illness:





Patient is a pleasant 70-year-old male who is seen and examined at the bedside 

for follow-up evaluation for his right hip.  He is status post right hip 

hemiarthroplasty performed by Dr. Antwon Rolon yesterday, 12/12/2020.  Patient 

states postoperatively he has had improvement of his right hip pain and that his

pain is well-controlled.  He has been utilizing an abductor pillow.  He has had 

difficulty with mobilization postoperatively.  His Carter catheter remains 

intact.  His urine is dark but states he has chronic dark urine due to 

Parkinson's medications.  Patient is known have Parkinson's disease, chronic 

gait dysfunction, and cognitive impairment due to Parkinson's disease.








Physical Exam Hip Hemiarthroplasty:





Status post surgical day number 1


Patient is examined lying in bed


Patient is awake, alert, and oriented 3


Vital signs stable


Good chest excursion with deep inspiration and expiration


No signs or symptoms of DVT; no calf pain


Lower extremity cuffs in place bilaterally


Abductor pillow intact


Dressing of the right hip is clean, dry, and intact; no erythema, purulence, or 

signs of infection


No significant pain with palpation over the surgical site


Full range of motion of ankles bilaterally


Neurovascularly intact bilateral lower extremities








Assessment:


Status post right hip hemiarthroplasty


Right hip pain


Status post fall


Parkinson's disease


Cognitive impairment due to Parkinson's disease


Chronic gait dysfunction








Plan:


1.  Patient may continue to weight-bear as tolerated on the lower extremity with

the assistance of a walker; patient may work with physical therapy to increase 

mobility and ambulation


2.  Continue pain control with IV Dilaudid and oral Norco as prescribed as 

needed for control of his pain


3.  Abductor pillow to remain in place at all times except while working with 

therapy


4.  Medicine to continue following the patient for their other medical diagnoses

including Parkinson's disease


5.  Continue with with anticoagulation therapy with 162 mg by mouth daily as 

prescribed by Dr. Rueda


5.  We'll continue to follow the patient; pending on the patient's progress, we 

may plan for discharge tomorrow to a rehabilitation facility if the patient is 

cleared by medicine


6.  Patient can follow-up with Dr. Antwon Rooln at [facility] in 2-3 weeks following discharge

## 2020-12-14 LAB
BASOPHILS # BLD AUTO: 0 K/UL (ref 0–0.2)
BASOPHILS NFR BLD AUTO: 0 %
EOSINOPHIL # BLD AUTO: 0 K/UL (ref 0–0.7)
EOSINOPHIL NFR BLD AUTO: 0 %
ERYTHROCYTE [DISTWIDTH] IN BLOOD BY AUTOMATED COUNT: 4 M/UL (ref 4.3–5.9)
ERYTHROCYTE [DISTWIDTH] IN BLOOD: 12.6 % (ref 11.5–15.5)
HCT VFR BLD AUTO: 38.9 % (ref 39–53)
HGB BLD-MCNC: 12.6 GM/DL (ref 13–17.5)
LYMPHOCYTES # SPEC AUTO: 0.9 K/UL (ref 1–4.8)
LYMPHOCYTES NFR SPEC AUTO: 10 %
MCH RBC QN AUTO: 31.5 PG (ref 25–35)
MCHC RBC AUTO-ENTMCNC: 32.3 G/DL (ref 31–37)
MCV RBC AUTO: 97.3 FL (ref 80–100)
MONOCYTES # BLD AUTO: 0.8 K/UL (ref 0–1)
MONOCYTES NFR BLD AUTO: 8 %
NEUTROPHILS # BLD AUTO: 7.7 K/UL (ref 1.3–7.7)
NEUTROPHILS NFR BLD AUTO: 80 %
PLATELET # BLD AUTO: 192 K/UL (ref 150–450)
WBC # BLD AUTO: 9.6 K/UL (ref 3.8–10.6)

## 2020-12-14 RX ADMIN — ASPIRIN 81 MG CHEWABLE TABLET SCH MG: 81 TABLET CHEWABLE at 08:25

## 2020-12-14 RX ADMIN — TAMSULOSIN HYDROCHLORIDE SCH MG: 0.4 CAPSULE ORAL at 17:38

## 2020-12-14 RX ADMIN — ENTACAPONE SCH MG: 200 TABLET, FILM COATED ORAL at 17:39

## 2020-12-14 RX ADMIN — CARBIDOPA AND LEVODOPA SCH EACH: 25; 100 TABLET ORAL at 08:24

## 2020-12-14 RX ADMIN — ENTACAPONE SCH MG: 200 TABLET, FILM COATED ORAL at 08:26

## 2020-12-14 RX ADMIN — POTASSIUM CHLORIDE SCH: 14.9 INJECTION, SOLUTION INTRAVENOUS at 08:19

## 2020-12-14 RX ADMIN — POTASSIUM CHLORIDE SCH: 14.9 INJECTION, SOLUTION INTRAVENOUS at 14:44

## 2020-12-14 RX ADMIN — CARBIDOPA AND LEVODOPA SCH EACH: 25; 100 TABLET ORAL at 17:39

## 2020-12-14 RX ADMIN — DOCUSATE SODIUM AND SENNOSIDES SCH: 50; 8.6 TABLET ORAL at 21:58

## 2020-12-14 RX ADMIN — CARBIDOPA AND LEVODOPA SCH EACH: 25; 100 TABLET ORAL at 21:58

## 2020-12-14 RX ADMIN — DONEPEZIL HYDROCHLORIDE SCH MG: 10 TABLET ORAL at 08:25

## 2020-12-14 RX ADMIN — THERA TABS SCH EACH: TAB at 08:25

## 2020-12-14 RX ADMIN — ENTACAPONE SCH MG: 200 TABLET, FILM COATED ORAL at 21:58

## 2020-12-14 NOTE — P.CONS
History of Present Illness





- Chief Complaint


Walking difficulty





- History of Present Illness





I had the opportunity to see patient for inpatient rehab consultation with 

regard to walking difficulty.  Patient admitted to [facility] December 11 history 

of fall at home and right hip pain.  Patient was seen and Dr. Rolon's office 

and then reevaluation and was admitted on December 11.  Underwent operative 

repair December 12, Dr. Rolon.  Seen postoperatively by Dr. Rueda a 

postoperative x-ray was done.  PT and OT prescribed.





Previous functional history as elicited from patient: 70-year-old right-handed w

joao male who is  lives in one floor home with wife.  Both retired.  Wife

gently does the cooking and they share laundry and driving.  Patient dependent 

with standing shower and gait without device previously.  PMD Dr. Austin in [place].  Patient denies tobacco or alcohol.





Family history mother was a smoker.





Review of Systems





Review of systems:


ENT: Denies sneezes or discharge.


Eyes: Denies discharge or photophobia.


Cardiac: Denies chest pain or palpitation.


Pulmonary: Denies cough or shortness of breath.


Gastrointestinal: Denies nausea, emesis, constipation, diarrhea.


Genitourinary: Denies discharge or frequency.


Musculoskeletal: Right hip discomfort.


Neurologic: Long-standing Parkinson.


Endocrine: Denies shakes or sweats.


Oncology: Denies cancers.


Dermatologic: Denies rash, itching, pruritus.


ALLERGY/immunology: Denies sneezes, rashes.








Past Medical History


Past Medical History: Musculoskeletal Disorder


Additional Past Medical History / Comment(s): Parkinson's


History of Any Multi-Drug Resistant Organisms: C-DIFF


Year Discovered:: 01/24/2017


MDRO Source:: "marginal" c-diff, pt states this is "all gone"


Past Surgical History: Orthopedic Surgery


Additional Past Surgical History / Comment(s): right knee sx, back sx, neck 

surgery


Past Psychological History: No Psychological Hx Reported


Smoking Status: Never smoker


Past Alcohol Use History: Occasional


Past Drug Use History: None Reported





Medications and Allergies


                                Home Medications











 Medication  Instructions  Recorded  Confirmed  Type


 


amantadine HCL [Symmetrel] 100 mg PO BID 02/14/17 12/11/20 History


 


Aspirin 162 mg PO DAILY 09/19/19 12/11/20 History


 


Carbidopa-Levodopa  mg 1 tab PO DAILY PRN 10/07/20 12/11/20 History





[Sinemet  mg]    


 


Carbidopa/Levodopa/Entacapone 1 tab PO TID 10/07/20 12/11/20 History





[Carbidopa-Levodopa-Enta 200 mg]    


 


Donepezil HCl [Aricept] 10 mg PO DAILY 10/07/20 12/11/20 History


 


Polyethylene Glycol 3350 [Miralax] 17 gm PO DAILY 10/07/20 12/11/20 History


 


Tamsulosin HCl [Flomax] 0.4 mg PO W/SUPPER 10/07/20 12/11/20 History








                                    Allergies











Allergy/AdvReac Type Severity Reaction Status Date / Time


 


bee pollen Allergy  Unknown Verified 12/11/20 20:29


 


Penicillins AdvReac  Diarrhea Verified 12/11/20 20:29





   (C-Diff)  














Physical Exam


Vitals: 


                                   Vital Signs











  Temp Pulse Resp BP Pulse Ox


 


 12/14/20 07:27  97.6 F  81  16  124/66  94 L


 


 12/14/20 02:30  98.3 F  59 L  17  117/64  94 L


 


 12/13/20 20:40  98.1 F  67  16  147/77  96


 


 12/13/20 20:00   69  16  


 


 12/13/20 14:00  98.3 F  81  16  132/72  93 L








                                Intake and Output











 12/13/20 12/14/20 12/14/20





 22:59 06:59 14:59


 


Output Total  1200 


 


Balance  -1200 


 


Output:   


 


  Urine  1200 


 


Other:   


 


  Voiding Method Indwelling Catheter  














Skin: Atrophic, intact.


General: Medium build and comfortable appearance.


Head: Normocephalic, atraumatic.


Eyes: Symmetric.  Pupils equal round.


Ears: Symmetric.  Hearing within normal limits.


Mouth: Clear.


Neck: Supple.  Carotid without bruit.


Cardiac: Regular rate and rhythm.


Lungs: Clear anteriorly and posteriorly.


Abdomen: Soft active nontender.


Extremities: Normal tone.


Neurological: Mental status: Alert, cooperative, pleasant.


Cranial nerves: Symmetric facial tone and trapezius.


Motor: Active movement all 4 limbs but with giveaway weakness right hip and leg.

  Parkinson tremors throughout.


Sensation: Intact throughout.


DTRs: Symmetric and equal throughout.


Mobility: Patient appears to be 1 person assist for standing and transfers.





Results


CBC & Chem 7: 


                                 12/14/20 06:05





                                 12/11/20 19:08


Labs: 


                  Abnormal Lab Results - Last 24 Hours (Table)











  12/14/20 Range/Units





  06:05 


 


RBC  4.00 L  (4.30-5.90)  m/uL


 


Hgb  12.6 L  (13.0-17.5)  gm/dL


 


Hct  38.9 L  (39.0-53.0)  %


 


Lymphocytes #  0.9 L  (1.0-4.8)  k/uL














Assessment and Plan


(1) Closed right hip fracture


Current Visit: No   Status: Acute   Priority: Medium   Code(s): S72.001A - 

FRACTURE OF UNSP PART OF NECK OF RIGHT FEMUR, INIT   SNOMED Code(s): 856374501


   


Plan: 





Impression:


1.  Walking will.


2.  Right hip fracture status post repair.  


3.  Parkinson disease.


4.  Osteoarthritis.





Comments and plan: At this time PT and OT are prescribed.  I discussed with 

patient purpose of of the rehab consultation with eye to possible inpatient 

rehab.  Note complication of the Parkinson which would seem to increase the 

likelihood of need of inpatient rehab.

## 2020-12-14 NOTE — P.PN
Subjective


Progress Note Date: 12/14/20


Principal diagnosis: 


Right hip hemiarthroplasty





Patient is seen at bedside this morning.  He is postop day #2 from right hip 

yenni arthroplasty.  He has pain at the surgical site as expected but denies any 

new complaints.  He denies numbness, tingling or calf pain.  Review of systems 

is negative for fever, chills, chest pain, shortness of breath or other








Objective





- Vital Signs


Vital signs: 


                                   Vital Signs











Temp  97.6 F   12/14/20 07:27


 


Pulse  81   12/14/20 07:27


 


Resp  16   12/14/20 07:27


 


BP  124/66   12/14/20 07:27


 


Pulse Ox  94 L  12/14/20 07:27








                                 Intake & Output











 12/13/20 12/14/20 12/14/20





 18:59 06:59 18:59


 


Output Total  1200 


 


Balance  -1200 


 


Output:   


 


  Urine  1200 


 


Other:   


 


  Voiding Method Indwelling Catheter Indwelling Catheter 














- Exam


Inspection reveals a benign surgical wound.  There is no active bleeding or 

drainage.  Neurovascular status is intact throughout the lower extremity with 

motor and sensation fully intact.  Calf is soft and nontender.  2+ dorsalis 

pedis pulse and less than 2 second cap refill is present.








- Constitutional


General appearance: Present: no acute distress





- Labs


CBC & Chem 7: 


                                 12/14/20 06:05





                                 12/11/20 19:08


Labs: 


                  Abnormal Lab Results - Last 24 Hours (Table)











  12/14/20 Range/Units





  06:05 


 


RBC  4.00 L  (4.30-5.90)  m/uL


 


Hgb  12.6 L  (13.0-17.5)  gm/dL


 


Hct  38.9 L  (39.0-53.0)  %


 


Lymphocytes #  0.9 L  (1.0-4.8)  k/uL














Assessment and Plan


(1) Closed right hip fracture


Narrative/Plan: 


He will continue with routine postop orthopedic protocol including pain 

management, wound care, PT, DVT prophylaxis and medical management.  Expect that

he will transfer to home with home health in next 1-2 days.


Current Visit: No   Status: Acute   Priority: Medium   Code(s): S72.001A - 

FRACTURE OF UNSP PART OF NECK OF RIGHT FEMUR, INIT   SNOMED Code(s): 259773095


   


Time with Patient: Less than 30

## 2020-12-14 NOTE — P.PN
Progress Note - Text


Progress Note Date: 12/14/20





- Chief Complaint


Hip pain








Consultation:


This is a pleasant 70-year-old patient was chronic stable medical conditions 

include osteoarthritis, Parkinson's disease, kidney dysfunction.  Patient was 

seen in Dr. Rolon's office on December 11 of the return to the office for 

worsening right hip pain.  No new injury.  He had fallen on December 1 and was 

having some pain in his right ear.  And it was worse with any kind of activity. 

Most of lifting.  Patient was then admitted.  Patient does not have any chest 

pain or shortness of breath.  And has a rather limited excess tolerance.  No 

cardiac history.  I had treated the patient for surgery earlier.  Patient 

underwent a right hip hemiarthroplasty for displaced right femoral neck 

fracture.


Today-patient work with physical therapy and used a walker for about 20 feet.  

Eating okay.  Pain control.  Parkinson symptoms stable





Review of systems: Was done for constitutional, cardiovascular, GI, pulmonary. 

relevant finding as above





Active Medications





Acetaminophen (Acetaminophen Tab 325 Mg Tab)  650 mg PO Q4HR PRN


   PRN Reason: Pain Scale 1 to 5


Hydrocodone Bitart/Acetaminophen (Hydrocodone/Apap 5-325mg 1 Each Tab)  1 each 

PO Q6HR PRN


   PRN Reason: Pain Scale 1 to 5


   Last Admin: 12/12/20 17:45 Dose:  1 each


   Documented by: 


Hydrocodone Bitart/Acetaminophen (Hydrocodone/Apap 10-325mg 1 Each Tab)  1 each 

PO Q6H PRN


   PRN Reason: Pain Scale 6 to 10


Amantadine HCl (Amantadine Hcl 100 Mg Cap)  100 mg PO BID [facility]


   Last Admin: 12/14/20 21:58 Dose:  100 mg


   Documented by: 


Aspirin (Aspirin 81 Mg)  162 mg PO DAILY [facility]


   Last Admin: 12/14/20 08:25 Dose:  162 mg


   Documented by: 


Carbidopa/Levodopa (Carbidopa-Levodopa  Mg 1 Each Tab)  1 each PO DAILY 

PRN


   PRN Reason: Parkinsonism


Carbidopa/Levodopa (Carbidopa-Levodopa  Mg 1 Each Tab)  2 each PO TID [facility]


   Last Admin: 12/14/20 21:58 Dose:  2 each


   Documented by: 


Diazepam (Diazepam 5 Mg Tab)  2.5 mg PO Q8HR PRN


   PRN Reason: Mild Spasms


Donepezil HCl (Donepezil 10 Mg Tab)  10 mg PO DAILY [facility]


   Last Admin: 12/14/20 08:25 Dose:  10 mg


   Documented by: 


Entacapone (Entacapone 200 Mg Tab)  200 mg PO TID [facility]


   Last Admin: 12/14/20 21:58 Dose:  200 mg


   Documented by: 


Hydromorphone HCl (Hydromorphone 0.2 Mg/1 Ml Syringe)  0.2 mg IVP Q3HR PRN


   PRN Reason: Pain Scale 4 to 6


Hydromorphone HCl (Hydromorphone 0.5 Mg/0.5 Ml Syringe)  0.125 mg IVP Q3HR PRN


   PRN Reason: Pain Scale 1 to 3


Hydromorphone HCl (Hydromorphone 0.5 Mg/0.5 Ml Syringe)  0.5 mg IVP Q3HR PRN


   PRN Reason: Pain Scale 7 to 10


Magnesium Hydroxide (Magnesium Hydroxide 2,400 Mg/10 Ml Cup)  2,400 mg PO DAILY 

PRN


   PRN Reason: Constipation


Multivitamins (Multivitamins, Thera 1 Each Tab)  1 each PO DAILY@1200 [facility]


   Last Admin: 12/14/20 08:25 Dose:  1 each


   Documented by: 


Naloxone HCl (Naloxone 0.4 Mg/Ml 1 Ml Vial)  0.2 mg IV Q2M PRN


   PRN Reason: Opioid Reversal


Ondansetron HCl (Ondansetron 4 Mg/2 Ml Vial)  4 mg IVP Q8HR PRN


   PRN Reason: Nausea And Vomiting


Polyethylene Glycol (Polyethylene Glycol 3350 17 Gm Powd.Pack)  17 gm PO DAILY 

[facility]


   Last Admin: 12/14/20 08:24 Dose:  17 gm


   Documented by: 


Senna/Docusate Sodium (Sennosides-Docusate Sodium 1 Each Tab)  2 each PO HS [facility]


   Last Admin: 12/14/20 21:58 Dose:  Not Given


   Documented by: 


Tamsulosin HCl (Tamsulosin 0.4 Mg Cap.Er.24h)  0.4 mg PO W/SUPPER [facility]


   Last Admin: 12/14/20 17:38 Dose:  0.4 mg


   Documented by: 


Temazepam (Temazepam 15 Mg Cap)  15 mg PO HS PRN


   PRN Reason: Insomnia


Tramadol HCl (Tramadol 50 Mg Tab)  50 mg PO Q6HR PRN


   PRN Reason: Pain Scale 1 to 5











Physical examination:


VITAL SIGNS: 98.8, 72, 18, 1 26 x 65, 98% room air


GENERAL: BMI 25.4, reclining in bed, comfortable


EYES: Pupils equal.  Conjunctiva normal.


NECK: JVD not raised; masses not palpable.


HEART: First and second heart sounds are normal;  no edema.  


LUNGS: Respiratory rate normal; clear to auscultation.  


ABDOMEN: Soft,  nontender, liver spleen not palpable, no masses palpable.  


PSYCH: Able to answer some simple questions. 


MUSCULAR skeletal: Evidence of OA.  Dressing over the right hip 


NEUROLOGICAL: Bradykinesia and some tremors





INVESTIGATIONS, reviewed in the clinical context:


December 14: White count 9.6 hemoglobin 12.6 platelets 192


December 13: White count 10.4 hemoglobin 13.5


White count 12.2 hemoglobin 14.4 platelets 221 potassium 4.3 creatinine 1.04


Chest x-ray film personally reviewed by me-some chronic changes possible 

fibrotic





Assessment:


- mechanical right femur neck fracture.-right hip hemiarthroplasty.


-Primary osteoarthritis


-Idiopathic Parkinson disorder


-Chronic gait dysfunction


-Cognitive impairment from Parkinson disease





Plan:


Patient forgetful physical therapy.  Discharge options being looked into.  

Continue current medication treatment plan.  Discussed with patient.


Thank you Dr. Rolon

## 2020-12-15 LAB
BASOPHILS # BLD AUTO: 0 K/UL (ref 0–0.2)
BASOPHILS NFR BLD AUTO: 0 %
EOSINOPHIL # BLD AUTO: 0.2 K/UL (ref 0–0.7)
EOSINOPHIL NFR BLD AUTO: 2 %
ERYTHROCYTE [DISTWIDTH] IN BLOOD BY AUTOMATED COUNT: 4.1 M/UL (ref 4.3–5.9)
ERYTHROCYTE [DISTWIDTH] IN BLOOD: 12.4 % (ref 11.5–15.5)
HCT VFR BLD AUTO: 39.7 % (ref 39–53)
HGB BLD-MCNC: 13.3 GM/DL (ref 13–17.5)
LYMPHOCYTES # SPEC AUTO: 1 K/UL (ref 1–4.8)
LYMPHOCYTES NFR SPEC AUTO: 11 %
MCH RBC QN AUTO: 32.4 PG (ref 25–35)
MCHC RBC AUTO-ENTMCNC: 33.4 G/DL (ref 31–37)
MCV RBC AUTO: 96.9 FL (ref 80–100)
MONOCYTES # BLD AUTO: 0.6 K/UL (ref 0–1)
MONOCYTES NFR BLD AUTO: 7 %
NEUTROPHILS # BLD AUTO: 6.9 K/UL (ref 1.3–7.7)
NEUTROPHILS NFR BLD AUTO: 78 %
PLATELET # BLD AUTO: 238 K/UL (ref 150–450)
WBC # BLD AUTO: 8.9 K/UL (ref 3.8–10.6)

## 2020-12-15 RX ADMIN — CARBIDOPA AND LEVODOPA SCH EACH: 25; 100 TABLET ORAL at 17:19

## 2020-12-15 RX ADMIN — ENTACAPONE SCH MG: 200 TABLET, FILM COATED ORAL at 17:19

## 2020-12-15 RX ADMIN — CARBIDOPA AND LEVODOPA SCH EACH: 25; 100 TABLET ORAL at 22:14

## 2020-12-15 RX ADMIN — ASPIRIN 81 MG CHEWABLE TABLET SCH MG: 81 TABLET CHEWABLE at 08:28

## 2020-12-15 RX ADMIN — TAMSULOSIN HYDROCHLORIDE SCH MG: 0.4 CAPSULE ORAL at 17:19

## 2020-12-15 RX ADMIN — ENTACAPONE SCH MG: 200 TABLET, FILM COATED ORAL at 08:28

## 2020-12-15 RX ADMIN — DONEPEZIL HYDROCHLORIDE SCH MG: 10 TABLET ORAL at 08:27

## 2020-12-15 RX ADMIN — ENTACAPONE SCH MG: 200 TABLET, FILM COATED ORAL at 22:13

## 2020-12-15 RX ADMIN — THERA TABS SCH EACH: TAB at 08:28

## 2020-12-15 RX ADMIN — DOCUSATE SODIUM AND SENNOSIDES SCH: 50; 8.6 TABLET ORAL at 22:17

## 2020-12-15 RX ADMIN — CARBIDOPA AND LEVODOPA SCH EACH: 25; 100 TABLET ORAL at 08:27

## 2020-12-15 NOTE — P.PN
Progress Note - Text


Progress Note Date: 12/15/20





- Chief Complaint


Hip pain








Consultation:


This is a pleasant 70-year-old patient was chronic stable medical conditions 

include osteoarthritis, Parkinson's disease, kidney dysfunction.  Patient was 

seen in Dr. Rolon's office on December 11 of the return to the office for 

worsening right hip pain.  No new injury.  He had fallen on December 1 and was 

having some pain in his right ear.  And it was worse with any kind of activity. 

Most of lifting.  Patient was then admitted.  Patient does not have any chest 

pain or shortness of breath.  And has a rather limited excess tolerance.  No 

cardiac history.  I had treated the patient for surgery earlier.  Patient 

underwent a right hip hemiarthroplasty for displaced right femoral neck 

fracture.  Improving with physical therapy.  Parkinson symptoms controlled.


Today-stable.  Tolerating diet.  Pending placement.  No new issues.





Review of systems: Was done for constitutional, cardiovascular, GI, pulmonary. 

relevant finding as above





Active Medications





Acetaminophen (Acetaminophen Tab 325 Mg Tab)  650 mg PO Q4HR PRN


   PRN Reason: Pain Scale 1 to 5


Hydrocodone Bitart/Acetaminophen (Hydrocodone/Apap 5-325mg 1 Each Tab)  1 each 

PO Q6HR PRN


   PRN Reason: Pain Scale 1 to 5


   Last Admin: 12/12/20 17:45 Dose:  1 each


   Documented by: 


Hydrocodone Bitart/Acetaminophen (Hydrocodone/Apap 10-325mg 1 Each Tab)  1 each 

PO Q6H PRN


   PRN Reason: Pain Scale 6 to 10


Amantadine HCl (Amantadine Hcl 100 Mg Cap)  100 mg PO BID [facility]


   Last Admin: 12/15/20 08:28 Dose:  100 mg


   Documented by: 


Aspirin (Aspirin 81 Mg)  162 mg PO DAILY [facility]


   Last Admin: 12/15/20 08:28 Dose:  162 mg


   Documented by: 


Carbidopa/Levodopa (Carbidopa-Levodopa  Mg 1 Each Tab)  1 each PO DAILY 

PRN


   PRN Reason: Parkinsonism


   Last Admin: 12/15/20 10:29 Dose:  1 each


   Documented by: 


Carbidopa/Levodopa (Carbidopa-Levodopa  Mg 1 Each Tab)  2 each PO TID [facility]


   Last Admin: 12/15/20 17:19 Dose:  2 each


   Documented by: 


Diazepam (Diazepam 5 Mg Tab)  2.5 mg PO Q8HR PRN


   PRN Reason: Mild Spasms


Donepezil HCl (Donepezil 10 Mg Tab)  10 mg PO DAILY [facility]


   Last Admin: 12/15/20 08:27 Dose:  10 mg


   Documented by: 


Entacapone (Entacapone 200 Mg Tab)  200 mg PO TID [facility]


   Last Admin: 12/15/20 17:19 Dose:  200 mg


   Documented by: 


Hydromorphone HCl (Hydromorphone 0.2 Mg/1 Ml Syringe)  0.2 mg IVP Q3HR PRN


   PRN Reason: Pain Scale 4 to 6


Hydromorphone HCl (Hydromorphone 0.5 Mg/0.5 Ml Syringe)  0.125 mg IVP Q3HR PRN


   PRN Reason: Pain Scale 1 to 3


Hydromorphone HCl (Hydromorphone 0.5 Mg/0.5 Ml Syringe)  0.5 mg IVP Q3HR PRN


   PRN Reason: Pain Scale 7 to 10


Magnesium Hydroxide (Magnesium Hydroxide 2,400 Mg/10 Ml Cup)  2,400 mg PO DAILY 

PRN


   PRN Reason: Constipation


Multivitamins (Multivitamins, Thera 1 Each Tab)  1 each PO DAILY@1200 [facility]


   Last Admin: 12/15/20 08:28 Dose:  1 each


   Documented by: 


Naloxone HCl (Naloxone 0.4 Mg/Ml 1 Ml Vial)  0.2 mg IV Q2M PRN


   PRN Reason: Opioid Reversal


Ondansetron HCl (Ondansetron 4 Mg/2 Ml Vial)  4 mg IVP Q8HR PRN


   PRN Reason: Nausea And Vomiting


Polyethylene Glycol (Polyethylene Glycol 3350 17 Gm Powd.Pack)  17 gm PO DAILY 

[facility]


   Last Admin: 12/15/20 08:28 Dose:  17 gm


   Documented by: 


Senna/Docusate Sodium (Sennosides-Docusate Sodium 1 Each Tab)  2 each PO HS [facility]


   Last Admin: 12/14/20 21:58 Dose:  Not Given


   Documented by: 


Tamsulosin HCl (Tamsulosin 0.4 Mg Cap.Er.24h)  0.4 mg PO W/SUPPER [facility]


   Last Admin: 12/15/20 17:19 Dose:  0.4 mg


   Documented by: 


Temazepam (Temazepam 15 Mg Cap)  15 mg PO HS PRN


   PRN Reason: Insomnia


Tramadol HCl (Tramadol 50 Mg Tab)  50 mg PO Q6HR PRN


   PRN Reason: Pain Scale 1 to 5














Physical examination:


VITAL SIGNS: 97.7, 73, 16, 1 53 x 83, 95% room air


GENERAL: Sitting up in a chair, eating


EYES: Pupils equal.  Conjunctiva normal.


NECK: JVD not raised; masses not palpable.


HEART: First and second heart sounds are normal;  no edema.  


LUNGS: Respiratory rate normal; clear to auscultation.  


ABDOMEN: Soft,  nontender, liver spleen not palpable, no masses palpable.  


PSYCH: Able to answer some simple questions. 


MUSCULAR skeletal: Evidence of OA.  Dressing over the right hip 


NEUROLOGICAL: Bradykinesia and some tremors





INVESTIGATIONS, reviewed in the clinical context:


December 15: White count 8.9 hemoglobin 13.3


December 14: White count 9.6 hemoglobin 12.6 platelets 192


December 13: White count 10.4 hemoglobin 13.5


White count 12.2 hemoglobin 14.4 platelets 221 potassium 4.3 creatinine 1.04


Chest x-ray film personally reviewed by me-some chronic changes possible 

fibrotic





Assessment:


- mechanical right femur neck fracture.-Followed by-right hip hemiarthroplasty.


-Primary osteoarthritis


-Idiopathic Parkinson disorder


-Chronic gait dysfunction


-Mild Cognitive impairment from Parkinson disease





Plan:


Receiving physical therapy..  Continue current medication treatment plan.  

Pending IPD placement


Thank you Dr. Rolon

## 2020-12-15 NOTE — P.PN
Subjective


Progress Note Date: 12/15/20


Principal diagnosis: 


Right hip hemiarthroplasty





Patient is seen at bedside this morning.  He is postop day #3 from right hip 

yenni arthroplasty.  He has controlled pain at the surgical site and denies any 

new complaints.  He denies numbness, tingling or calf pain.  Review of systems 

is negative for fever, chills, chest pain, shortness of breath or other








Objective





- Vital Signs


Vital signs: 


                                   Vital Signs











Temp  98.9 F   12/15/20 07:45


 


Pulse  81   12/15/20 07:45


 


Resp  16   12/15/20 07:45


 


BP  131/69   12/15/20 07:45


 


Pulse Ox  97   12/15/20 07:45








                                 Intake & Output











 12/14/20 12/15/20 12/15/20





 18:59 06:59 18:59


 


Intake Total 300 700 200


 


Output Total 300 1025 


 


Balance 0 -325 200


 


Intake:   


 


  Intake, IV Titration  400 





  Amount   


 


    Sodium Chloride 0.9% 50  400 





    ml @ 0 mls/hr IV .STK-MED   





    ONE with ceFAZolin 2,000   





    mg Rx#:[id number]   


 


  Oral 300 300 200


 


Output:   


 


  Urine 300 1025 


 


Other:   


 


  Voiding Method Indwelling Catheter Indwelling Catheter 














- Exam


Inspection reveals a benign surgical wound.  There is no active bleeding or 

drainage.  Neurovascular status is intact throughout the lower extremity with 

motor and sensation fully intact.  Calf is soft and nontender.  2+ dorsalis 

pedis pulse and less than 2 second cap refill is present.








- Constitutional


General appearance: Present: no acute distress





- Labs


CBC & Chem 7: 


                                 12/15/20 07:31





                                 12/11/20 19:08


Labs: 


                  Abnormal Lab Results - Last 24 Hours (Table)











  12/15/20 Range/Units





  07:31 


 


RBC  4.10 L  (4.30-5.90)  m/uL














Assessment and Plan


(1) Closed right hip fracture


Narrative/Plan: 


He will continue with routine postop orthopedic protocol including pain 

management, wound care, PT, DVT prophylaxis and medical management.  Request for

inpatient rehab has been submitted and will expect transfer in next 1-2 days.


Current Visit: No   Status: Acute   Priority: Medium   Code(s): S72.001A - F

RACTURE OF UNSP PART OF NECK OF RIGHT FEMUR, INIT   SNOMED Code(s): 274149919


   


Time with Patient: Less than 30

## 2020-12-16 VITALS — RESPIRATION RATE: 18 BRPM

## 2020-12-16 VITALS — SYSTOLIC BLOOD PRESSURE: 137 MMHG | DIASTOLIC BLOOD PRESSURE: 71 MMHG | TEMPERATURE: 98.8 F | HEART RATE: 73 BPM

## 2020-12-16 LAB
BASOPHILS # BLD AUTO: 0 K/UL (ref 0–0.2)
BASOPHILS NFR BLD AUTO: 0 %
EOSINOPHIL # BLD AUTO: 0.2 K/UL (ref 0–0.7)
EOSINOPHIL NFR BLD AUTO: 3 %
ERYTHROCYTE [DISTWIDTH] IN BLOOD BY AUTOMATED COUNT: 3.76 M/UL (ref 4.3–5.9)
ERYTHROCYTE [DISTWIDTH] IN BLOOD: 12.3 % (ref 11.5–15.5)
HCT VFR BLD AUTO: 36.7 % (ref 39–53)
HGB BLD-MCNC: 12.1 GM/DL (ref 13–17.5)
LYMPHOCYTES # SPEC AUTO: 0.9 K/UL (ref 1–4.8)
LYMPHOCYTES NFR SPEC AUTO: 14 %
MCH RBC QN AUTO: 32.1 PG (ref 25–35)
MCHC RBC AUTO-ENTMCNC: 32.9 G/DL (ref 31–37)
MCV RBC AUTO: 97.5 FL (ref 80–100)
MONOCYTES # BLD AUTO: 0.5 K/UL (ref 0–1)
MONOCYTES NFR BLD AUTO: 8 %
NEUTROPHILS # BLD AUTO: 4.9 K/UL (ref 1.3–7.7)
NEUTROPHILS NFR BLD AUTO: 73 %
PLATELET # BLD AUTO: 233 K/UL (ref 150–450)
WBC # BLD AUTO: 6.8 K/UL (ref 3.8–10.6)

## 2020-12-16 RX ADMIN — CARBIDOPA AND LEVODOPA SCH EACH: 25; 100 TABLET ORAL at 08:15

## 2020-12-16 RX ADMIN — THERA TABS SCH EACH: TAB at 08:15

## 2020-12-16 RX ADMIN — ASPIRIN 81 MG CHEWABLE TABLET SCH MG: 81 TABLET CHEWABLE at 08:15

## 2020-12-16 RX ADMIN — HYDROCODONE BITARTRATE AND ACETAMINOPHEN PRN EACH: 5; 325 TABLET ORAL at 15:13

## 2020-12-16 RX ADMIN — DONEPEZIL HYDROCHLORIDE SCH MG: 10 TABLET ORAL at 08:15

## 2020-12-16 RX ADMIN — ENTACAPONE SCH MG: 200 TABLET, FILM COATED ORAL at 08:15

## 2020-12-16 NOTE — P.PN
Progress Note - Text


Progress Note Date: 12/16/20





- Chief Complaint


Hip pain








Consultation:


This is a pleasant 70-year-old patient was chronic stable medical conditions 

include osteoarthritis, Parkinson's disease, kidney dysfunction.  Patient was 

seen in Dr. Rolon's office on December 11 of the return to the office for 

worsening right hip pain.  No new injury.  He had fallen on December 1 and was 

having some pain in his right ear.  And it was worse with any kind of activity. 

Most of lifting.  Patient was then admitted.  Patient does not have any chest 

pain or shortness of breath.  And has a rather limited excess tolerance.  No 

cardiac history.  I had treated the patient for surgery earlier.  Patient 

underwent a right hip hemiarthroplasty for displaced right femoral neck 

fracture.  Improving with physical therapy.  Parkinson symptoms controlled.


Today-sitting up in a chair.  Eating well.  No new issues.  Patient's home 

medication of Sinemet and Comtan combined medication pill was corrected the home

medication and being discharged the same..





Review of systems: Was done for constitutional, cardiovascular, GI, pulmonary. 

relevant finding as above





Current medications reviewed in today's electronic records





Physical examination:


VITAL SIGNS: 97.8, 69, 18, 147 with 79, 98% room air


GENERAL: Sitting up in a chair, eating


EYES: Pupils equal.  Conjunctiva normal.


NECK: JVD not raised; masses not palpable.


HEART: First and second heart sounds are normal;  no edema.  


LUNGS: Respiratory rate normal; clear auscultation.  


ABDOMEN: Soft,  nontender, liver spleen not palpable, no masses palpable.  


PSYCH: Able to answer some simple questions. 


MUSCULAR skeletal: Evidence of OA.  Dressing over the right hip 


NEUROLOGICAL: Bradykinesia and some tremors





INVESTIGATIONS, reviewed in the clinical context:


December 16: White count 6.8 hemoglobin 12.1


Coronavirus P/Cr-not detected


December 15: White count 8.9 hemoglobin 13.3


December 14: White count 9.6 hemoglobin 12.6 platelets 192


December 13: White count 10.4 hemoglobin 13.5


White count 12.2 hemoglobin 14.4 platelets 221 potassium 4.3 creatinine 1.04


Chest x-ray film personally reviewed by me-some chronic changes possible 

fibrotic





Assessment:


- mechanical right femur neck fracture.-Followed by-right hip hemiarthroplasty.


-Primary osteoarthritis


-Idiopathic Parkinson disorder


-Chronic gait dysfunction


-Mild Cognitive impairment from Parkinson disease





Plan:


  Continue current medication treatment plan.  Discharge medications reviewed.  

Stable to go to the [facility].  Follow up with PCP upon discharge.


Thank you Dr. Rolon

## 2020-12-16 NOTE — P.DS
Providers


Date of admission: 


12/11/20 18:31





Expected date of discharge: 12/16/20


Attending physician: 


Antwon Rolon





Consults: 





                                        





12/11/20 16:39


Consult Physician Stat 


   Consulting Provider: Sherwin Rueda


   Consult Reason/Comments: pre op clearance and medical management


   Do you want consulting provider notified?: Yes





12/14/20 10:12


Consult Physician Stat 


   Consulting Provider: Venu Perla


   Consult Reason/Comments: Evaluate for Inpatient Rehab


   Do you want consulting provider notified?: Yes











Primary care physician: 


Stated None








- Discharge Diagnosis(es)


(1) Closed right hip fracture


Patient was admitted to the OR on 12/12/2020 to undergo a right hip yenni-

arthroplasty for right hip fracture. He desired to proceed with elective surgery

after given informed consent. He underwent the above procedure which he 

tolerated well without complication. Postoperative hospital course has remained 

without complication. On day of discharge he is afebrile, vital signs stable, la

bs within acceptable ranges, tolerating by mouth meds and diet, voiding without 

difficulty, positive flatus, denies abdominal pain or calf pain, pain is 

controlled on oral pain medication and has no new complaints.  Wound is benign, 

neurovascular status is intact, calf is soft and nontender, abdomen soft and 

nontender.  Review of systems is negative for numbness, tingling, fever, chills,

chest pain, shortness of breath, nausea, vomiting, dizziness, headaches, slurred

speech or other.


Current Visit: No   Status: Acute   Priority: Medium   


Procedures: 


Right Hip hemiarthroplasty





Patient Condition at Discharge: Fair





Plan - Discharge Summary


Discharge Rx Participant: Yes


New Discharge Prescriptions: 


New


   Entacapone [Comtan] 200 mg PO TID  tab


   Sennosides-Docusate Sodium [Senokot-S] 2 each PO HS  tab


   Aspirin [Adult Low Dose Aspirin EC] 81 mg PO BID #60 tablet.


   HYDROcodone/APAP 5-325MG [Norco 5-325] 1 tab PO Q4HR PRN #42 tab


     PRN Reason: Pain





Continue


   amantadine HCL [Symmetrel] 100 mg PO BID


   Aspirin 162 mg PO DAILY


   Tamsulosin HCl [Flomax] 0.4 mg PO W/SUPPER


   Polyethylene Glycol 3350 [Miralax] 17 gm PO DAILY


   Donepezil HCl [Aricept] 10 mg PO DAILY


   Carbidopa/Levodopa/Entacapone [Carbidopa-Levodopa 200 mg-Enta] 1 tab PO TID


   Carbidopa-Levodopa  mg [Sinemet  mg] 1 tab PO DAILY PRN


     PRN Reason: Parkinsonism


Discharge Medication List





amantadine HCL [Symmetrel] 100 mg PO BID 02/14/17 [History]


Aspirin 162 mg PO DAILY 09/19/19 [History]


Carbidopa-Levodopa  mg [Sinemet  mg] 1 tab PO DAILY PRN 10/07/20 

[History]


Carbidopa/Levodopa/Entacapone [Carbidopa-Levodopa 200 mg-Enta] 1 tab PO TID 

10/07/20 [History]


Donepezil HCl [Aricept] 10 mg PO DAILY 10/07/20 [History]


Polyethylene Glycol 3350 [Miralax] 17 gm PO DAILY 10/07/20 [History]


Tamsulosin HCl [Flomax] 0.4 mg PO W/SUPPER 10/07/20 [History]


Entacapone [Comtan] 200 mg PO TID  tab 12/15/20 [Rx]


Sennosides-Docusate Sodium [Senokot-S] 2 each PO HS  tab 12/15/20 [Rx]


Aspirin [Adult Low Dose Aspirin EC] 81 mg PO BID #60 tablet. 12/16/20 [Rx]


HYDROcodone/APAP 5-325MG [Norco 5-325] 1 tab PO Q4HR PRN #42 tab 12/16/20 [Rx]








Follow up Appointment(s)/Referral(s): 


[facility] [NON-STAFF] - 


None,Stated [Primary Care Provider] - 1 Week


Antwon Rolon MD [STAFF PHYSICIAN] - 12/23/20 1:30 pm


Activity/Diet/Wound Care/Special Instructions: 


Keep wound clean and dry


Take meds as directed


Follow-up with Dr. Rolon in office


Weight bear as tolerated


May shower in 3 days if no bleeding


No sitting in low chairs or internally rotating right leg





 


Discharge Disposition: TRANSFER TO SNF/ECF

## 2021-03-06 ENCOUNTER — HOSPITAL ENCOUNTER (INPATIENT)
Dept: HOSPITAL 47 - EC | Age: 71
LOS: 7 days | Discharge: HOME | DRG: 689 | End: 2021-03-13
Attending: HOSPITALIST | Admitting: HOSPITALIST
Payer: MEDICARE

## 2021-03-06 VITALS — BODY MASS INDEX: 27.7 KG/M2

## 2021-03-06 DIAGNOSIS — R53.81: ICD-10-CM

## 2021-03-06 DIAGNOSIS — Z79.82: ICD-10-CM

## 2021-03-06 DIAGNOSIS — R41.89: ICD-10-CM

## 2021-03-06 DIAGNOSIS — R26.89: ICD-10-CM

## 2021-03-06 DIAGNOSIS — G20: ICD-10-CM

## 2021-03-06 DIAGNOSIS — Z91.030: ICD-10-CM

## 2021-03-06 DIAGNOSIS — G92: ICD-10-CM

## 2021-03-06 DIAGNOSIS — Z79.899: ICD-10-CM

## 2021-03-06 DIAGNOSIS — B96.20: ICD-10-CM

## 2021-03-06 DIAGNOSIS — Z86.19: ICD-10-CM

## 2021-03-06 DIAGNOSIS — Z88.0: ICD-10-CM

## 2021-03-06 DIAGNOSIS — M19.91: ICD-10-CM

## 2021-03-06 DIAGNOSIS — N30.00: Primary | ICD-10-CM

## 2021-03-06 DIAGNOSIS — N32.0: ICD-10-CM

## 2021-03-06 DIAGNOSIS — N28.9: ICD-10-CM

## 2021-03-06 DIAGNOSIS — R33.8: ICD-10-CM

## 2021-03-06 DIAGNOSIS — N40.1: ICD-10-CM

## 2021-03-06 DIAGNOSIS — Z20.822: ICD-10-CM

## 2021-03-06 LAB
ALBUMIN SERPL-MCNC: 3.6 G/DL (ref 3.5–5)
ALP SERPL-CCNC: 118 U/L (ref 38–126)
ALT SERPL-CCNC: <6 U/L (ref 4–49)
ANION GAP SERPL CALC-SCNC: 6 MMOL/L
AST SERPL-CCNC: 24 U/L (ref 17–59)
BASOPHILS # BLD AUTO: 0 K/UL (ref 0–0.2)
BASOPHILS NFR BLD AUTO: 0 %
BUN SERPL-SCNC: 24 MG/DL (ref 9–20)
CALCIUM SPEC-MCNC: 8.6 MG/DL (ref 8.4–10.2)
CHLORIDE SERPL-SCNC: 101 MMOL/L (ref 98–107)
CO2 SERPL-SCNC: 27 MMOL/L (ref 22–30)
EOSINOPHIL # BLD AUTO: 0.2 K/UL (ref 0–0.7)
EOSINOPHIL NFR BLD AUTO: 2 %
ERYTHROCYTE [DISTWIDTH] IN BLOOD BY AUTOMATED COUNT: 4.22 M/UL (ref 4.3–5.9)
ERYTHROCYTE [DISTWIDTH] IN BLOOD: 13.3 % (ref 11.5–15.5)
GLUCOSE SERPL-MCNC: 82 MG/DL (ref 74–99)
HCT VFR BLD AUTO: 38.9 % (ref 39–53)
HGB BLD-MCNC: 13 GM/DL (ref 13–17.5)
LYMPHOCYTES # SPEC AUTO: 1.1 K/UL (ref 1–4.8)
LYMPHOCYTES NFR SPEC AUTO: 12 %
MAGNESIUM SPEC-SCNC: 1.9 MG/DL (ref 1.6–2.3)
MCH RBC QN AUTO: 30.9 PG (ref 25–35)
MCHC RBC AUTO-ENTMCNC: 33.4 G/DL (ref 31–37)
MCV RBC AUTO: 92.3 FL (ref 80–100)
MONOCYTES # BLD AUTO: 0.7 K/UL (ref 0–1)
MONOCYTES NFR BLD AUTO: 7 %
NEUTROPHILS # BLD AUTO: 6.7 K/UL (ref 1.3–7.7)
NEUTROPHILS NFR BLD AUTO: 76 %
PH UR: 6 [PH] (ref 5–8)
PLATELET # BLD AUTO: 172 K/UL (ref 150–450)
POTASSIUM SERPL-SCNC: 4.4 MMOL/L (ref 3.5–5.1)
PROT SERPL-MCNC: 6.5 G/DL (ref 6.3–8.2)
PROT UR QL: (no result)
RBC UR QL: >182 /HPF (ref 0–5)
SODIUM SERPL-SCNC: 134 MMOL/L (ref 137–145)
SP GR UR: 1.03 (ref 1–1.03)
SQUAMOUS UR QL AUTO: 1 /HPF (ref 0–4)
UROBILINOGEN UR QL STRIP: <2 MG/DL (ref ?–2)
WBC # BLD AUTO: 8.8 K/UL (ref 3.8–10.6)
WBC # UR AUTO: 152 /HPF (ref 0–5)

## 2021-03-06 PROCEDURE — 84145 PROCALCITONIN (PCT): CPT

## 2021-03-06 PROCEDURE — 87186 SC STD MICRODIL/AGAR DIL: CPT

## 2021-03-06 PROCEDURE — 36415 COLL VENOUS BLD VENIPUNCTURE: CPT

## 2021-03-06 PROCEDURE — 87635 SARS-COV-2 COVID-19 AMP PRB: CPT

## 2021-03-06 PROCEDURE — 83605 ASSAY OF LACTIC ACID: CPT

## 2021-03-06 PROCEDURE — 80048 BASIC METABOLIC PNL TOTAL CA: CPT

## 2021-03-06 PROCEDURE — 99285 EMERGENCY DEPT VISIT HI MDM: CPT

## 2021-03-06 PROCEDURE — 85025 COMPLETE CBC W/AUTO DIFF WBC: CPT

## 2021-03-06 PROCEDURE — 87086 URINE CULTURE/COLONY COUNT: CPT

## 2021-03-06 PROCEDURE — 96361 HYDRATE IV INFUSION ADD-ON: CPT

## 2021-03-06 PROCEDURE — 96374 THER/PROPH/DIAG INJ IV PUSH: CPT

## 2021-03-06 PROCEDURE — 87077 CULTURE AEROBIC IDENTIFY: CPT

## 2021-03-06 PROCEDURE — 72125 CT NECK SPINE W/O DYE: CPT

## 2021-03-06 PROCEDURE — 83735 ASSAY OF MAGNESIUM: CPT

## 2021-03-06 PROCEDURE — 71046 X-RAY EXAM CHEST 2 VIEWS: CPT

## 2021-03-06 PROCEDURE — 87040 BLOOD CULTURE FOR BACTERIA: CPT

## 2021-03-06 PROCEDURE — 81001 URINALYSIS AUTO W/SCOPE: CPT

## 2021-03-06 PROCEDURE — 80053 COMPREHEN METABOLIC PANEL: CPT

## 2021-03-06 PROCEDURE — 70450 CT HEAD/BRAIN W/O DYE: CPT

## 2021-03-06 RX ADMIN — CEFAZOLIN SCH: 330 INJECTION, POWDER, FOR SOLUTION INTRAMUSCULAR; INTRAVENOUS at 23:25

## 2021-03-06 NOTE — ED
Male Urogenital HPI





- General


Chief complaint: Urogenital


Stated complaint: Urogenital


Time Seen by Provider: 03/06/21 19:42


Source: patient, EMS


Mode of arrival: EMS


Limitations: no limitations





- History of Present Illness


Initial comments: 


71-year-old [place] having 

increased confusion and more falling.  Patient had a positive urinalysis on the 

dipstick therefore was sent in for treatment.  Patient states he is more 

fatigued today however doesn't realize why he is fully here.  Patient denies 

fever nausea no abdominal pain no back pain no change of bowels.   admits 

to Parkinson's.  Patient states at times he does fall more than others.  Patient

has no headache no visual changes








- Related Data


                                Home Medications











 Medication  Instructions  Recorded  Confirmed


 


Aspirin 162 mg PO DAILY 09/19/19 12/11/20


 


Carbidopa/Levodopa/Entacapone 1 tab PO AS DIRECTED 10/07/20 12/16/20





[Carbidopa-Levodopa 200 mg-Enta]   


 


Donepezil HCl [Aricept] 10 mg PO DAILY 10/07/20 12/11/20


 


Polyethylene Glycol 3350 [Miralax] 17 gm PO DAILY 10/07/20 12/11/20


 


Tamsulosin HCl [Flomax] 0.4 mg PO W/SUPPER 10/07/20 12/11/20








                                  Previous Rx's











 Medication  Instructions  Recorded


 


Sennosides-Docusate Sodium 2 each PO HS  tab 12/15/20





[Senokot-S]  


 


Aspirin [Adult Low Dose Aspirin EC] 81 mg PO BID #60 tablet. 12/16/20


 


HYDROcodone/APAP 5-325MG [Norco 1 tab PO Q4HR PRN #42 tab 12/16/20





5-325]  


 


amantadine HCL [Symmetrel] 100 mg PO TID #0 12/16/20











                                    Allergies











Allergy/AdvReac Type Severity Reaction Status Date / Time


 


bee pollen Allergy  Unknown Verified 12/11/20 20:29


 


Penicillins AdvReac  Diarrhea Verified 12/11/20 20:29





   (C-Diff)  














Review of Systems


ROS Statement: 


Those systems with pertinent positive or pertinent negative responses have been 

documented in the HPI.





ROS Other: All systems not noted in ROS Statement are negative.


Constitutional: Denies: fever, chills


Gastrointestinal: Denies: abdominal pain, nausea, vomiting, diarrhea


Skin: Denies: rash


Neurological: Reports: confusion, abnormal gait





Past Medical History


Past Medical History: Musculoskeletal Disorder


Additional Past Medical History / Comment(s): Parkinson's


History of Any Multi-Drug Resistant Organisms: C-DIFF


Date of last positivie culture/infection: 01/24/2017


MDRO Source:: "marginal" c-diff, pt states this is "all gone"


Past Surgical History: Orthopedic Surgery


Additional Past Surgical History / Comment(s): right knee sx, back sx, neck 

surgery


Past Psychological History: No Psychological Hx Reported


Smoking Status: Never smoker


Past Alcohol Use History: Occasional


Past Drug Use History: None Reported





General Exam


Limitations: no limitations


General appearance: in no apparent distress, lethargic


Head exam: Present: atraumatic, normocephalic, normal inspection


Eye exam: Present: normal appearance, PERRL, EOMI.  Absent: scleral icterus, 

conjunctival injection, periorbital swelling


ENT exam: Present: normal exam, mucous membranes moist


Respiratory exam: Present: normal lung sounds bilaterally.  Absent: respiratory 

distress, wheezes, rales, rhonchi, stridor


Cardiovascular Exam: Present: regular rate, normal rhythm, normal heart sounds. 

 Absent: systolic murmur, diastolic murmur, rubs, gallop, clicks


GI/Abdominal exam: Present: soft, normal bowel sounds.  Absent: distended, 

tenderness, guarding, rebound, rigid


Neurological exam: Present: alert.  Absent: oriented X3, normal gait, motor 

sensory deficit


Psychiatric exam: Present: normal affect, normal mood


Skin exam: Present: warm, dry, intact, normal color.  Absent: rash





Course


                                   Vital Signs











  03/06/21 03/06/21





  19:38 21:35


 


Temperature 97.1 F L 97.5 F L


 


Pulse Rate 70 68


 


Respiratory 16 16





Rate  


 


Blood Pressure 112/64 145/73


 


O2 Sat by Pulse 99 98





Oximetry  














Medical Decision Making





- Medical Decision Making


Patient has confusion and very fatigued during exam.  Patient does answer 

appropriately.  Wife was there for part of the exam and answered a lot of 

questions.  Patient found to have a urinary tract infection we'll start IV 

antibiotic be admitted to Dr. service discussed with Dr. Surya Nunes.








- Lab Data


Result diagrams: 


                                 03/06/21 20:30





                                 03/06/21 20:30


                                   Lab Results











  03/06/21 03/06/21 03/06/21 Range/Units





  20:30 20:30 20:30 


 


WBC  8.8    (3.8-10.6)  k/uL


 


RBC  4.22 L    (4.30-5.90)  m/uL


 


Hgb  13.0    (13.0-17.5)  gm/dL


 


Hct  38.9 L    (39.0-53.0)  %


 


MCV  92.3    (80.0-100.0)  fL


 


MCH  30.9    (25.0-35.0)  pg


 


MCHC  33.4    (31.0-37.0)  g/dL


 


RDW  13.3    (11.5-15.5)  %


 


Plt Count  172    (150-450)  k/uL


 


MPV  7.5    


 


Neutrophils %  76    %


 


Lymphocytes %  12    %


 


Monocytes %  7    %


 


Eosinophils %  2    %


 


Basophils %  0    %


 


Neutrophils #  6.7    (1.3-7.7)  k/uL


 


Lymphocytes #  1.1    (1.0-4.8)  k/uL


 


Monocytes #  0.7    (0-1.0)  k/uL


 


Eosinophils #  0.2    (0-0.7)  k/uL


 


Basophils #  0.0    (0-0.2)  k/uL


 


Sodium   134 L   (137-145)  mmol/L


 


Potassium   4.4   (3.5-5.1)  mmol/L


 


Chloride   101   ()  mmol/L


 


Carbon Dioxide   27   (22-30)  mmol/L


 


Anion Gap   6   mmol/L


 


BUN   24 H   (9-20)  mg/dL


 


Creatinine   0.75   (0.66-1.25)  mg/dL


 


Est GFR (CKD-EPI)AfAm   >90   (>60 ml/min/1.73 sqM)  


 


Est GFR (CKD-EPI)NonAf   >90   (>60 ml/min/1.73 sqM)  


 


Glucose   82   (74-99)  mg/dL


 


Plasma Lactic Acid [place]    1.4  (0.7-2.0)  mmol/L


 


Calcium   8.6   (8.4-10.2)  mg/dL


 


Magnesium   1.9   (1.6-2.3)  mg/dL


 


Total Bilirubin   1.4 H   (0.2-1.3)  mg/dL


 


AST   24   (17-59)  U/L


 


ALT   <6   (4-49)  U/L


 


Alkaline Phosphatase   118   ()  U/L


 


Total Protein   6.5   (6.3-8.2)  g/dL


 


Albumin   3.6   (3.5-5.0)  g/dL


 


Urine Color     


 


Urine Appearance     (Clear)  


 


Urine pH     (5.0-8.0)  


 


Ur Specific Gravity     (1.001-1.035)  


 


Urine Protein     (Negative)  


 


Urine Glucose (UA)     (Negative)  


 


Urine Ketones     (Negative)  


 


Urine Blood     (Negative)  


 


Urine Nitrite     (Negative)  


 


Urine Bilirubin     (Negative)  


 


Urine Urobilinogen     (<2.0)  mg/dL


 


Ur Leukocyte Esterase     (Negative)  


 


Urine RBC     (0-5)  /hpf


 


Urine WBC     (0-5)  /hpf


 


Ur Squamous Epith Cells     (0-4)  /hpf


 


Urine Bacteria     (None)  /hpf


 


Urine Mucus     (None)  /hpf














  03/06/21 Range/Units





  20:38 


 


WBC   (3.8-10.6)  k/uL


 


RBC   (4.30-5.90)  m/uL


 


Hgb   (13.0-17.5)  gm/dL


 


Hct   (39.0-53.0)  %


 


MCV   (80.0-100.0)  fL


 


MCH   (25.0-35.0)  pg


 


MCHC   (31.0-37.0)  g/dL


 


RDW   (11.5-15.5)  %


 


Plt Count   (150-450)  k/uL


 


MPV   


 


Neutrophils %   %


 


Lymphocytes %   %


 


Monocytes %   %


 


Eosinophils %   %


 


Basophils %   %


 


Neutrophils #   (1.3-7.7)  k/uL


 


Lymphocytes #   (1.0-4.8)  k/uL


 


Monocytes #   (0-1.0)  k/uL


 


Eosinophils #   (0-0.7)  k/uL


 


Basophils #   (0-0.2)  k/uL


 


Sodium   (137-145)  mmol/L


 


Potassium   (3.5-5.1)  mmol/L


 


Chloride   ()  mmol/L


 


Carbon Dioxide   (22-30)  mmol/L


 


Anion Gap   mmol/L


 


BUN   (9-20)  mg/dL


 


Creatinine   (0.66-1.25)  mg/dL


 


Est GFR (CKD-EPI)AfAm   (>60 ml/min/1.73 sqM)  


 


Est GFR (CKD-EPI)NonAf   (>60 ml/min/1.73 sqM)  


 


Glucose   (74-99)  mg/dL


 


Plasma Lactic Acid [place]   (0.7-2.0)  mmol/L


 


Calcium   (8.4-10.2)  mg/dL


 


Magnesium   (1.6-2.3)  mg/dL


 


Total Bilirubin   (0.2-1.3)  mg/dL


 


AST   (17-59)  U/L


 


ALT   (4-49)  U/L


 


Alkaline Phosphatase   ()  U/L


 


Total Protein   (6.3-8.2)  g/dL


 


Albumin   (3.5-5.0)  g/dL


 


Urine Color  Dark Orange  


 


Urine Appearance  Cloudy  (Clear)  


 


Urine pH  6.0  (5.0-8.0)  


 


Ur Specific Gravity  1.029  (1.001-1.035)  


 


Urine Protein  1+ H  (Negative)  


 


Urine Glucose (UA)  Negative  (Negative)  


 


Urine Ketones  Trace H  (Negative)  


 


Urine Blood  Large H  (Negative)  


 


Urine Nitrite  Positive  (Negative)  


 


Urine Bilirubin  Negative  (Negative)  


 


Urine Urobilinogen  <2.0  (<2.0)  mg/dL


 


Ur Leukocyte Esterase  Moderate H  (Negative)  


 


Urine RBC  >182 H  (0-5)  /hpf


 


Urine WBC  152 H  (0-5)  /hpf


 


Ur Squamous Epith Cells  1  (0-4)  /hpf


 


Urine Bacteria  Many H  (None)  /hpf


 


Urine Mucus  Many H  (None)  /hpf














Disposition


Clinical Impression: 


 UTI (urinary tract infection), Confusion





Disposition: ADMITTED AS IP TO THIS HOSP


Condition: Fair


Instructions (If sedation given, give patient instructions):  Urinary Tract 

Infection in Men (ED)


Is patient prescribed a controlled substance at d/c from ED?: No


Referrals: 


Scottie Austin MD [Primary Care Provider] - 1-2 days

## 2021-03-06 NOTE — CT
EXAMINATION TYPE: CT brain cspine wo con

 

DATE OF EXAM: 3/6/2021

 

COMPARISON: 9/19/2019

 

HISTORY: ams

 

CT DLP: 3266.6 mGycm

Automated exposure control for dose reduction was used.

 

There is cerebral cortical atrophy. There is no mass effect nor midline shift. There is no sign of in
tracranial hemorrhage. The calvarium is intact.

 

There is laminectomy defect at multiple levels of the cervical spine. There is disc space narrowing a
nd apparent previous anterior fusion surgery at C3-4 C5-6 and C6-7. There is slight anterior subluxat
ion of C7 in relation to T1. I see no cervical spine acute fracture. There is posterior fusion surger
y from C4 to C7 fixing the C6 vertebral body fracture evident on the old exam.

 

IMPRESSION:

Mild cerebral atrophy. No acute intracranial abnormality. Brain unchanged.

 

Previous cervical spine surgery. No acute fracture seen. There is mild C7-T1 subluxation unchanged an
d consistent with degenerative phenomenon..

## 2021-03-07 LAB
ANION GAP SERPL CALC-SCNC: 7 MMOL/L
BASOPHILS # BLD AUTO: 0 K/UL (ref 0–0.2)
BASOPHILS NFR BLD AUTO: 0 %
BUN SERPL-SCNC: 18 MG/DL (ref 9–20)
CALCIUM SPEC-MCNC: 8.2 MG/DL (ref 8.4–10.2)
CHLORIDE SERPL-SCNC: 104 MMOL/L (ref 98–107)
CO2 SERPL-SCNC: 24 MMOL/L (ref 22–30)
EOSINOPHIL # BLD AUTO: 0.2 K/UL (ref 0–0.7)
EOSINOPHIL NFR BLD AUTO: 2 %
ERYTHROCYTE [DISTWIDTH] IN BLOOD BY AUTOMATED COUNT: 4.1 M/UL (ref 4.3–5.9)
ERYTHROCYTE [DISTWIDTH] IN BLOOD: 13.2 % (ref 11.5–15.5)
GLUCOSE SERPL-MCNC: 85 MG/DL (ref 74–99)
HCT VFR BLD AUTO: 38.4 % (ref 39–53)
HGB BLD-MCNC: 12.7 GM/DL (ref 13–17.5)
LYMPHOCYTES # SPEC AUTO: 0.7 K/UL (ref 1–4.8)
LYMPHOCYTES NFR SPEC AUTO: 10 %
MCH RBC QN AUTO: 31 PG (ref 25–35)
MCHC RBC AUTO-ENTMCNC: 33.1 G/DL (ref 31–37)
MCV RBC AUTO: 93.6 FL (ref 80–100)
MONOCYTES # BLD AUTO: 0.7 K/UL (ref 0–1)
MONOCYTES NFR BLD AUTO: 8 %
NEUTROPHILS # BLD AUTO: 5.9 K/UL (ref 1.3–7.7)
NEUTROPHILS NFR BLD AUTO: 77 %
PLATELET # BLD AUTO: 139 K/UL (ref 150–450)
POTASSIUM SERPL-SCNC: 4 MMOL/L (ref 3.5–5.1)
SODIUM SERPL-SCNC: 135 MMOL/L (ref 137–145)
WBC # BLD AUTO: 7.7 K/UL (ref 3.8–10.6)

## 2021-03-07 RX ADMIN — ENOXAPARIN SODIUM SCH MG: 40 INJECTION SUBCUTANEOUS at 20:44

## 2021-03-07 RX ADMIN — ENTACAPONE SCH MG: 200 TABLET, FILM COATED ORAL at 09:56

## 2021-03-07 RX ADMIN — ENTACAPONE SCH MG: 200 TABLET, FILM COATED ORAL at 12:22

## 2021-03-07 RX ADMIN — CEFAZOLIN SCH: 330 INJECTION, POWDER, FOR SOLUTION INTRAMUSCULAR; INTRAVENOUS at 19:36

## 2021-03-07 RX ADMIN — ENTACAPONE SCH MG: 200 TABLET, FILM COATED ORAL at 16:41

## 2021-03-07 RX ADMIN — CARBIDOPA AND LEVODOPA SCH EACH: 25; 100 TABLET ORAL at 09:57

## 2021-03-07 RX ADMIN — DONEPEZIL HYDROCHLORIDE SCH MG: 10 TABLET ORAL at 20:44

## 2021-03-07 RX ADMIN — TAMSULOSIN HYDROCHLORIDE SCH MG: 0.4 CAPSULE ORAL at 12:22

## 2021-03-07 RX ADMIN — CARBIDOPA AND LEVODOPA SCH EACH: 25; 100 TABLET ORAL at 01:09

## 2021-03-07 RX ADMIN — CARBIDOPA AND LEVODOPA SCH EACH: 25; 100 TABLET ORAL at 16:40

## 2021-03-07 RX ADMIN — CARBIDOPA AND LEVODOPA SCH EACH: 25; 100 TABLET ORAL at 12:22

## 2021-03-07 RX ADMIN — ENTACAPONE SCH MG: 200 TABLET, FILM COATED ORAL at 01:10

## 2021-03-07 RX ADMIN — ASPIRIN 81 MG CHEWABLE TABLET SCH MG: 81 TABLET CHEWABLE at 09:57

## 2021-03-07 NOTE — P.HPIM
History of Present Illness


H&P Date: 03/07/21


Chief Complaint: Increasing confusion


History of presenting complaint:


This is a pleasant 71-year-old patient was chronic stable medical conditions 

include osteoarthritis, Parkinson's disease, kidney dysfunction.  Patient is 

brought in by the EMS.  Patient does told the EMS that patient been having more 

difficulty in walking for the [place] because so in patient with a small wart of 

blood diseases urine today.  Dipstick was positive for an active UTI.  Patient 

been having frequent urination.  He has Parkinson's disease and is activity has 

been getting worse.  He's been requiring more assistance because of weakness and

unstable while walking.  Patient himself is not able to give much of her 

history.  He knows that is the hospital.  Not sure exactly why he is here.  Slow

to answer questions.





Review of systems:


GEN.:  Tired


EYES: None


HEENT: None


NECK: None


RESPIRATORY: None


CARDIOVASCULAR: None


GASTROINTESTINAL: None


GENITOURINARY: None


MUSCULOSKELETAL: Joint pains


LYMPHATICS: None


HEMATOLOGICAL: None  


PSYCHIATRY: Forgetful


NEUROLOGICAL: Tremors.





Past medical history to include:


Osteoarthritis, Parkinson, gait dysfunction, BPH, cognitive impairment





Social history:


No history of smoking.  Alcohol occasional.





Physical examination:


VITAL SIGNS: 97.1, 70, 16, 112 x 64, 99% room air


GENERAL: BMI 27.7, sitting up on the bed, eating slowly


EYES: Pupils equal.  Conjunctiva normal.


HEENT: External appearance of nose and ears normal, oral cavity grossly normal.


NECK: JVD not raised; masses not palpable.


HEART: First and second heart sounds are normal;  no edema.  


LUNGS: Respiratory rate normal; clear to auscultation.  


ABDOMEN: Soft,  nontender, liver spleen not palpable, no masses palpable.  


PSYCH: Able to answer occasional question.  He knows that he is in the hospital.

 Does not know the month of the year.. 


MUSCULAR skeletal: Evidence of OA. 


NEUROLOGICAL: [Cranial nerves grossly intact; slow movements and some tremors . 

 Slow speech


LYMPHATICS: No lymph nodes palpable in the axilla and neck





INVESTIGATIONS, reviewed in the clinical context:


WBC 7.7 hemoglobin 12.7 platelets 139 potassium 4 creatinine 0.67


UA positive for blood, leukoesterase, WBC, bacteria


Coronavirus [PCR]-not detected





Assessment and plan:


- acute on chronic medical debility worsening from underlying Parkinson's 

disease secondary to acute UTI.  PTOT


-Acute UTI with cystitis.  Start patient on IV ceftriaxone


-Primary osteoarthritis, use Tylenol when necessary


-Idiopathic Parkinson disorder, with acute deterioration due to underlying UTI. 

 Continue with Sinemet and Comtan


-Chronic gait dysfunction


-Cognitive impairment from Parkinson disease,


-Possible acute delirium from underlying infection


-Bladder outflow obstruction continue with Flomax


-DVT prophylaxis, subcu Lovenox





Patient will need at least 2 nights stay in the hospital.  Given the severity of

 his condition. 








Past Medical History


Past Medical History: Musculoskeletal Disorder


Additional Past Medical History / Comment(s): Parkinson's


History of Any Multi-Drug Resistant Organisms: C-DIFF


Date of last positivie culture/infection: 01/24/2017


MDRO Source:: "marginal" c-diff, pt states this is "all gone"


Past Surgical History: Orthopedic Surgery


Additional Past Surgical History / Comment(s): right knee sx, back sx, neck 

surgery


Past Psychological History: No Psychological Hx Reported


Smoking Status: Never smoker


Past Alcohol Use History: Occasional


Past Drug Use History: None Reported





Medications and Allergies


                                Home Medications











 Medication  Instructions  Recorded  Confirmed  Type


 


Carbidopa/Levodopa/Entacapone 1 tab PO TID@0800,1200,1600 10/07/20 03/06/21 

History





[Carbidopa-Levodopa 200 mg-Enta]    


 


Polyethylene Glycol 3350 [Miralax] 17 gm PO DAILY PRN 10/07/20 03/06/21 History


 


Tamsulosin HCl [Flomax] 0.4 mg PO DAILY@0800 10/07/20 03/06/21 History


 


Aspirin [Adult Low Dose Aspirin EC] 81 mg PO DAILY@0800 03/06/21 03/06/21 

History


 


Carbidopa-Levodopa  mg 1 tab PO TID@0800,1200,1600 03/06/21 03/06/21 

History





[Sinemet  mg]    


 


Donepezil [Aricept] 10 mg PO DAILY@2000 03/06/21 03/06/21 History


 


Ibuprofen 400 mg PO AS DIRECTED 03/06/21 03/06/21 History


 


Ibuprofen [Advil] 200 - 400 mg PO DAILY PRN 03/06/21 03/06/21 History


 


Sennosides [Senna] 16.2 mg PO DAILY PRN 03/06/21 03/06/21 History


 


amantadine HCL [Amantadine] 100 mg PO DAILY@0800 03/06/21 03/06/21 History








                                    Allergies











Allergy/AdvReac Type Severity Reaction Status Date / Time


 


bee pollen Allergy  Unknown Verified 03/06/21 23:19


 


Penicillins AdvReac  Diarrhea Verified 03/06/21 23:19





   (C-Diff)  














Physical Exam


Vitals: 


                                   Vital Signs











  Temp Pulse Resp BP Pulse Ox


 


 03/07/21 06:26  97.8 F  67  18  137/73  96


 


 03/07/21 02:00   65  16  128/64  96


 


 03/07/21 01:13   78  16  120/72  97


 


 03/06/21 23:30   72  16  134/70  98


 


 03/06/21 21:35  97.5 F L  68  16  145/73  98


 


 03/06/21 19:38  97.1 F L  70  16  112/64  99








                                Intake and Output











 03/06/21 03/07/21 03/07/21





 22:59 06:59 14:59


 


Other:   


 


  Weight 108.862 kg  














Results


CBC & Chem 7: 


                                 03/07/21 04:06





                                 03/07/21 04:06


Labs: 


                  Abnormal Lab Results - Last 24 Hours (Table)











  03/06/21 03/06/21 03/06/21 Range/Units





  20:30 20:30 20:38 


 


RBC  4.22 L    (4.30-5.90)  m/uL


 


Hgb     (13.0-17.5)  gm/dL


 


Hct  38.9 L    (39.0-53.0)  %


 


Plt Count     (150-450)  k/uL


 


Lymphocytes #     (1.0-4.8)  k/uL


 


Sodium   134 L   (137-145)  mmol/L


 


BUN   24 H   (9-20)  mg/dL


 


Calcium     (8.4-10.2)  mg/dL


 


Total Bilirubin   1.4 H   (0.2-1.3)  mg/dL


 


Urine Protein    1+ H  (Negative)  


 


Urine Ketones    Trace H  (Negative)  


 


Urine Blood    Large H  (Negative)  


 


Ur Leukocyte Esterase    Moderate H  (Negative)  


 


Urine RBC    >182 H  (0-5)  /hpf


 


Urine WBC    152 H  (0-5)  /hpf


 


Urine Bacteria    Many H  (None)  /hpf


 


Urine Mucus    Many H  (None)  /hpf














  03/07/21 03/07/21 Range/Units





  04:06 04:06 


 


RBC  4.10 L   (4.30-5.90)  m/uL


 


Hgb  12.7 L   (13.0-17.5)  gm/dL


 


Hct  38.4 L   (39.0-53.0)  %


 


Plt Count  139 L   (150-450)  k/uL


 


Lymphocytes #  0.7 L   (1.0-4.8)  k/uL


 


Sodium   135 L  (137-145)  mmol/L


 


BUN    (9-20)  mg/dL


 


Calcium   8.2 L  (8.4-10.2)  mg/dL


 


Total Bilirubin    (0.2-1.3)  mg/dL


 


Urine Protein    (Negative)  


 


Urine Ketones    (Negative)  


 


Urine Blood    (Negative)  


 


Ur Leukocyte Esterase    (Negative)  


 


Urine RBC    (0-5)  /hpf


 


Urine WBC    (0-5)  /hpf


 


Urine Bacteria    (None)  /hpf


 


Urine Mucus    (None)  /hpf








                      Microbiology - Last 24 Hours (Table)











 03/06/21 20:30 Blood Culture Gram Stain - Preliminary





 Blood 


 


 03/06/21 20:30 Blood Culture - Final





 Blood

## 2021-03-08 RX ADMIN — ENTACAPONE SCH MG: 200 TABLET, FILM COATED ORAL at 12:15

## 2021-03-08 RX ADMIN — CARBIDOPA AND LEVODOPA SCH EACH: 25; 100 TABLET ORAL at 08:41

## 2021-03-08 RX ADMIN — ASPIRIN 81 MG CHEWABLE TABLET SCH MG: 81 TABLET CHEWABLE at 08:41

## 2021-03-08 RX ADMIN — ENTACAPONE SCH MG: 200 TABLET, FILM COATED ORAL at 08:42

## 2021-03-08 RX ADMIN — ENOXAPARIN SODIUM SCH MG: 40 INJECTION SUBCUTANEOUS at 08:42

## 2021-03-08 RX ADMIN — ENTACAPONE SCH MG: 200 TABLET, FILM COATED ORAL at 17:44

## 2021-03-08 RX ADMIN — CARBIDOPA AND LEVODOPA SCH EACH: 25; 100 TABLET ORAL at 17:44

## 2021-03-08 RX ADMIN — TAMSULOSIN HYDROCHLORIDE SCH MG: 0.4 CAPSULE ORAL at 08:41

## 2021-03-08 RX ADMIN — CEFAZOLIN SCH MLS/HR: 330 INJECTION, POWDER, FOR SOLUTION INTRAMUSCULAR; INTRAVENOUS at 17:47

## 2021-03-08 RX ADMIN — DONEPEZIL HYDROCHLORIDE SCH MG: 10 TABLET ORAL at 20:17

## 2021-03-08 RX ADMIN — CARBIDOPA AND LEVODOPA SCH EACH: 25; 100 TABLET ORAL at 11:28

## 2021-03-08 NOTE — P.PN
Progress Note - Text


Progress Note Date: 03/08/21





Chief Complaint: Increasing confusion


History of presenting complaint:


This is a pleasant 71-year-old patient was chronic stable medical conditions 

include osteoarthritis, Parkinson's disease, kidney dysfunction.  Patient is 

brought in by the EMS.  Patient does told the EMS that patient been having more 

difficulty in walking for the [place] because so in patient with a small wart of 

blood diseases urine today.  Dipstick was positive for an active UTI.  Patient 

been having frequent urination.  He has Parkinson's disease and is activity has 

been getting worse.  He's been requiring more assistance because of weakness and

unstable while walking.  Patient himself is not able to give much of her 

history.  He knows that is the hospital.  Not sure exactly why he is here.  Slow

to answer questions.


Admitted with acute UTI with cystitis.  Worsening Parkinson disorder from the 

same.  


Today-had some acute delirium last night with worsening.  Better this morning.  

Oral intake improving





Review of systems: Was done for constitutional, cardiovascular, GI, pulmonary. 

relevant finding as above





Active Medications





Amantadine HCl (Amantadine Hcl 100 Mg Cap)  100 mg PO DAILY [facility]


   Last Admin: 03/08/21 08:41 Dose:  100 mg


   Documented by: 


Aspirin (Aspirin 81 Mg)  81 mg PO DAILY [facility]


   Last Admin: 03/08/21 08:41 Dose:  81 mg


   Documented by: 


Carbidopa/Levodopa (Carbidopa-Levodopa  Mg 1 Each Tab)  2 each PO 

TID@0800,1200,1600 [facility]


   Last Admin: 03/08/21 17:44 Dose:  2 each


   Documented by: 


Donepezil HCl (Donepezil 10 Mg Tab)  10 mg PO DAILY@2000 [facility]


   Last Admin: 03/08/21 20:17 Dose:  10 mg


   Documented by: 


Enoxaparin Sodium (Enoxaparin 40 Mg/0.4 Ml Syringe)  40 mg SQ DAILY [facility]


   Last Admin: 03/08/21 08:42 Dose:  40 mg


   Documented by: 


Entacapone (Entacapone 200 Mg Tab)  200 mg PO TID@0800,1200,1600 [facility]


   Last Admin: 03/08/21 17:44 Dose:  200 mg


   Documented by: 


Sodium Chloride (Saline 0.9%)  1,000 mls @ 50 mls/hr IV .Q20H [facility]


   Last Admin: 03/08/21 17:47 Dose:  50 mls/hr


   Documented by: 


Ceftriaxone Sodium 1 gm/ (Sodium Chloride)  50 mls @ 100 mls/hr IVPB Q24HR [facility]


   Last Admin: 03/08/21 08:42 Dose:  100 mls/hr


   Documented by: 


Lorazepam (Lorazepam 2 Mg/Ml Inj)  0.5 mg IV Q6HR PRN


   PRN Reason: Anxiety


   Last Admin: 03/07/21 22:57 Dose:  0.5 mg


   Documented by: 


Naloxone HCl (Naloxone 0.4 Mg/Ml 1 Ml Vial)  0.2 mg IV Q2M PRN


   PRN Reason: Opioid Reversal


Polyethylene Glycol (Polyethylene Glycol 3350 17 Gm Powd.Pack)  17 gm PO DAILY 

PRN


   PRN Reason: Constipation


   Last Admin: 03/07/21 15:48 Dose:  17 gm


   Documented by: 


Senna (Sennosides 8.6 Mg Tab)  16.2 mg PO DAILY PRN


   PRN Reason: Constipation


Tamsulosin HCl (Tamsulosin 0.4 Mg Cap.Er.24h)  0.4 mg PO DAILY@0800 [facility]


   Last Admin: 03/08/21 08:41 Dose:  0.4 mg


   Documented by: 











Past medical history to include:


Osteoarthritis, Parkinson, gait dysfunction, BPH, cognitive impairment





Social history:


No history of smoking.  Alcohol occasional.





Physical examination:


VITAL SIGNS: 97.6, 87, 22, 148.71, 95% room air


GENERAL: Sitting up in a chair, tired


EYES: Pupils equal.  Conjunctiva normal.


HEENT: External appearance of nose and ears normal, oral cavity grossly normal.


NECK: JVD not raised; masses not palpable.


HEART: First and second heart sounds are normal;  no edema.  


LUNGS: Respiratory rate normal; clear to auscultation.  


ABDOMEN: Soft,  nontender, liver spleen not palpable, no masses palpable.  


PSYCH: Able to answer occasional question.  


MUSCULAR skeletal: Evidence of OA. 


NEUROLOGICAL: [Cranial nerves grossly intact; slow movements and some tremors . 

 Slow speech








INVESTIGATIONS, reviewed in the clinical context:


Urine culture positive for E. coli.  Blood culture positive for E. coli.


WBC 7.7 hemoglobin 12.7 platelets 139 potassium 4 creatinine 0.67


UA positive for blood, leukoesterase, WBC, bacteria


Coronavirus [PCR]-not detected





Assessment and plan:


- acute on chronic medical debility worsening from underlying Parkinson's 

disease secondary to acute UTI.  PTOT


-Acute UTI with cystitis.  Start patient on IV ceftriaxone.  Both blood and 

urine culture come back positive for E. coli.


-Primary osteoarthritis, use Tylenol when necessary


-Idiopathic Parkinson disorder, with acute deterioration due to underlying UTI. 

 Continue with Sinemet and Comtan


-Chronic gait dysfunction


-Cognitive impairment from Parkinson disease,


-Possible acute delirium from underlying infection


-Bladder outflow obstruction continue with Flomax


-DVT prophylaxis, subcu Lovenox





Patient is started respond to ceftriaxone.  We'll have the patient and the 

hospital for another one or 2 nights.  Given his age prognosis guarded.

## 2021-03-09 LAB
ANION GAP SERPL CALC-SCNC: 8 MMOL/L
BASOPHILS # BLD AUTO: 0 K/UL (ref 0–0.2)
BASOPHILS NFR BLD AUTO: 0 %
BUN SERPL-SCNC: 20 MG/DL (ref 9–20)
CALCIUM SPEC-MCNC: 8.9 MG/DL (ref 8.4–10.2)
CHLORIDE SERPL-SCNC: 105 MMOL/L (ref 98–107)
CO2 SERPL-SCNC: 23 MMOL/L (ref 22–30)
EOSINOPHIL # BLD AUTO: 0.2 K/UL (ref 0–0.7)
EOSINOPHIL NFR BLD AUTO: 2 %
ERYTHROCYTE [DISTWIDTH] IN BLOOD BY AUTOMATED COUNT: 4.58 M/UL (ref 4.3–5.9)
ERYTHROCYTE [DISTWIDTH] IN BLOOD: 13.2 % (ref 11.5–15.5)
GLUCOSE SERPL-MCNC: 104 MG/DL (ref 74–99)
HCT VFR BLD AUTO: 42.6 % (ref 39–53)
HGB BLD-MCNC: 14 GM/DL (ref 13–17.5)
LYMPHOCYTES # SPEC AUTO: 1.3 K/UL (ref 1–4.8)
LYMPHOCYTES NFR SPEC AUTO: 14 %
MCH RBC QN AUTO: 30.5 PG (ref 25–35)
MCHC RBC AUTO-ENTMCNC: 32.8 G/DL (ref 31–37)
MCV RBC AUTO: 93 FL (ref 80–100)
MONOCYTES # BLD AUTO: 0.8 K/UL (ref 0–1)
MONOCYTES NFR BLD AUTO: 8 %
NEUTROPHILS # BLD AUTO: 6.6 K/UL (ref 1.3–7.7)
NEUTROPHILS NFR BLD AUTO: 73 %
PLATELET # BLD AUTO: 239 K/UL (ref 150–450)
POTASSIUM SERPL-SCNC: 4.3 MMOL/L (ref 3.5–5.1)
SODIUM SERPL-SCNC: 136 MMOL/L (ref 137–145)
WBC # BLD AUTO: 9 K/UL (ref 3.8–10.6)

## 2021-03-09 RX ADMIN — CARBIDOPA AND LEVODOPA SCH EACH: 25; 100 TABLET ORAL at 12:36

## 2021-03-09 RX ADMIN — ASPIRIN 81 MG CHEWABLE TABLET SCH MG: 81 TABLET CHEWABLE at 09:48

## 2021-03-09 RX ADMIN — CARBIDOPA AND LEVODOPA SCH EACH: 25; 100 TABLET ORAL at 09:49

## 2021-03-09 RX ADMIN — CEFAZOLIN SCH MLS/HR: 330 INJECTION, POWDER, FOR SOLUTION INTRAMUSCULAR; INTRAVENOUS at 12:37

## 2021-03-09 RX ADMIN — ENOXAPARIN SODIUM SCH MG: 40 INJECTION SUBCUTANEOUS at 09:48

## 2021-03-09 RX ADMIN — DONEPEZIL HYDROCHLORIDE SCH MG: 10 TABLET ORAL at 20:52

## 2021-03-09 RX ADMIN — TAMSULOSIN HYDROCHLORIDE SCH MG: 0.4 CAPSULE ORAL at 09:48

## 2021-03-09 RX ADMIN — ENTACAPONE SCH MG: 200 TABLET, FILM COATED ORAL at 09:49

## 2021-03-09 RX ADMIN — CEFAZOLIN SCH MLS/HR: 330 INJECTION, POWDER, FOR SOLUTION INTRAMUSCULAR; INTRAVENOUS at 20:53

## 2021-03-09 RX ADMIN — ENTACAPONE SCH MG: 200 TABLET, FILM COATED ORAL at 12:37

## 2021-03-09 RX ADMIN — ENTACAPONE SCH: 200 TABLET, FILM COATED ORAL at 16:00

## 2021-03-09 RX ADMIN — CARBIDOPA AND LEVODOPA SCH: 25; 100 TABLET ORAL at 15:59

## 2021-03-09 NOTE — P.PN
Progress Note - Text


Progress Note Date: 03/09/21





Chief Complaint: Increasing confusion


History of presenting complaint:


This is a pleasant 71-year-old patient was chronic stable medical conditions 

include osteoarthritis, Parkinson's disease, kidney dysfunction.  Patient is 

brought in by the EMS.  Patient does told the EMS that patient been having more 

difficulty in walking for the [place] because so in patient with a small wart of 

blood diseases urine today.  Dipstick was positive for an active UTI.  Patient 

been having frequent urination.  He has Parkinson's disease and is activity has 

been getting worse.  He's been requiring more assistance because of weakness and

unstable while walking.  Patient himself is not able to give much of her 

history.  He knows that is the hospital.  Not sure exactly why he is here.  Slow

to answer questions.


Admitted with acute UTI with cystitis.  Worsening Parkinson disorder from the 

same.  Also to acute delirium from the same.  Started IV ceftriaxone.


Today-doing better today.  Sitting up in a chair.  Oral intake is improving.  No

fever.  Answering questions slowly





Review of systems: Was done for constitutional, cardiovascular, GI, pulmonary. 

relevant finding as above





Active Medications





Amantadine HCl (Amantadine Hcl 100 Mg Cap)  100 mg PO TID [facility]


   Last Admin: 03/09/21 22:29 Dose:  100 mg


   Documented by: 


Aspirin (Aspirin 81 Mg)  81 mg PO DAILY [facility]


   Last Admin: 03/09/21 09:48 Dose:  81 mg


   Documented by: 


Carbidopa/Levodopa (Carbidopa-Levodopa  Mg 1 Each Tab)  2 each PO 

TID@0800,1200,1600 [facility]


   Last Admin: 03/09/21 15:59 Dose:  Not Given


   Documented by: 


Donepezil HCl (Donepezil 10 Mg Tab)  10 mg PO DAILY@2000 [facility]


   Last Admin: 03/09/21 20:52 Dose:  10 mg


   Documented by: 


Enoxaparin Sodium (Enoxaparin 40 Mg/0.4 Ml Syringe)  40 mg SQ DAILY [facility]


   Last Admin: 03/09/21 09:48 Dose:  40 mg


   Documented by: 


Entacapone (Entacapone 200 Mg Tab)  200 mg PO TID@0800,1200,1600 [facility]


   Last Admin: 03/09/21 16:00 Dose:  Not Given


   Documented by: 


Sodium Chloride (Saline 0.9%)  1,000 mls @ 50 mls/hr IV .Q20H [facility]


   Last Admin: 03/09/21 20:53 Dose:  50 mls/hr


   Documented by: 


Ceftriaxone Sodium 1 gm/ (Sodium Chloride)  50 mls @ 100 mls/hr IVPB Q24HR [facility]


   Last Admin: 03/09/21 09:50 Dose:  100 mls/hr


   Documented by: 


Lorazepam (Lorazepam 2 Mg/Ml Inj)  0.5 mg IV Q6HR PRN


   PRN Reason: Anxiety


   Last Admin: 03/07/21 22:57 Dose:  0.5 mg


   Documented by: 


Naloxone HCl (Naloxone 0.4 Mg/Ml 1 Ml Vial)  0.2 mg IV Q2M PRN


   PRN Reason: Opioid Reversal


Polyethylene Glycol (Polyethylene Glycol 3350 17 Gm Powd.Pack)  17 gm PO DAILY 

PRN


   PRN Reason: Constipation


   Last Admin: 03/07/21 15:48 Dose:  17 gm


   Documented by: 


Senna (Sennosides 8.6 Mg Tab)  16.2 mg PO DAILY PRN


   PRN Reason: Constipation


Tamsulosin HCl (Tamsulosin 0.4 Mg Cap.Er.24h)  0.4 mg PO DAILY@0800 [facility]


   Last Admin: 03/09/21 09:48 Dose:  0.4 mg


   Documented by: 














Past medical history to include:


Osteoarthritis, Parkinson, gait dysfunction, BPH, cognitive impairment





Social history:


No history of smoking.  Alcohol occasional.





Physical examination:


VITAL SIGNS: 97.6, 92, 20, 1 32 x 70


GENERAL: Sitting up in a chair, tired


EYES: Pupils equal.  Conjunctiva normal.


HEENT: External appearance of nose and ears normal, oral cavity grossly normal.


NECK: JVD not raised; masses not palpable.


HEART: First and second heart sounds are normal;  no edema.  


LUNGS: Respiratory rate normal; clear to auscultation.  


ABDOMEN: Soft,  nontender, liver spleen not palpable, no masses palpable.  


PSYCH: Able to answer occasional question.  


MUSCULAR skeletal: Evidence of OA. 


NEUROLOGICAL: [Cranial nerves grossly intact; slow movements and some tremors . 

 Pain drooling.  Slow speech








INVESTIGATIONS, reviewed in the clinical context:


March 9: WBC 9 hemoglobin 14 potassium 4.3 creatinine 0.69 pro-calcitonin 0.19


Urine culture positive for E. coli.  Blood culture positive for E. coli.  From 

March 6.


WBC 7.7 hemoglobin 12.7 platelets 139 potassium 4 creatinine 0.67


UA positive for blood, leukoesterase, WBC, bacteria


Coronavirus [PCR]-not detected





Assessment and plan:


- acute on chronic medical debility worsening from underlying Parkinson's 

disease secondary to acute UTI.  PTOT


-Acute UTI with cystitis.  IV ceftriaxone.  Both blood and urine culture come 

back positive for E. coli.


-Primary osteoarthritis, use Tylenol when necessary


-Idiopathic Parkinson disorder, with acute deterioration due to underlying UTI. 

 Continue with Sinemet and Comtan


-Chronic gait dysfunction


-Cognitive impairment from Parkinson disease,


-Possible acute delirium from underlying infection


-Bladder outflow obstruction continue with Flomax


-DVT prophylaxis, subcu Lovenox





Continue with IV ceftriaxone.  Probably another one or 2 days in the hospital.

## 2021-03-09 NOTE — CDI
Documentation Clarification Form



Date: 03/09/2021 01:45:44 PM

From: Sarah Jasso CCS, CCDS

Phone: [phone]

MRN: [id number]

Admit Date: 03/07/2021 07:37:00 PM

Patient Name: Chase Breen

Visit Number: [id number]

Discharge Date:  





ATTENTION: The Clinical Documentation Specialists (CDI) and [facility] Coding Staff 
appreciate your assistance in clarifying documentation. Please respond to the 
clarification below the line at the bottom and electronically sign. The CDI & 
[facility] Coding staff will review the response and follow-up if needed. Please note: 
Queries are made part of the Legal Health Record. If you have any questions, 
please contact the author of this message via ITS.



Dr. Sherwin Rueda:



Per the 3/7 History & Physical: Acute on chronic medical debility worsening from
underlying Parkinson's disease secondary to acute UTI. Acute UTI with cystitis. 
Idiopathic Parkinson disorder, with acute deterioration due to underlying UTI. 
Possible acute delirium from underlying infection. 



History/Risk Factors: Parkinson's Disease, Primary OA, Chronic Gait Dysfunction,
Cognitive Impairment from Parkinson's, Bladder outflow obstruction. 

Clinical Indicators: Patient presented to the ED via EMS on 3/7 with increasing 
confusion. "Patient told EMS he has been having more difficulty walking, small 
amount of blood in urine today. Positive for UTI, frequent urination. He has 
Parkinson's disease and has been getting worse, requiring more assistance 
because of weakness and unstable while walking. Patient himself is not able to 
give much history. He knows that is the hospital, not sure exactly why he is 
here, slow to answer questions.:

ED Clinical Impression: UTI, Confusion

3/6 VS: T 97.1, P 70, R 16, /64, PO 99 vnm - 98 RA, BMI: 27.4

3/6 LAB: Na 134, BUN 24, Total Bili 1.4

3/6 UA: Dark Orange, Cloudy, 1+ Protein, Trace Ketones, Large Blood, Positive 
Nitrite, Mod Esterase, RBC >182, 

3/6 Urine cx: Pos E Coli

3/6 COVID Negative

3/6 Imaging: CXR: No active cardiopulmonary disease. CT Brain/C Spine: Mild 
cerebral atrophy. No acute intracranial abnormality. Brain unchanged. Previous 
cervical spine surgery. No acute fracture seen. There is mild C7-T1 subluxation 
unchanged and consistent with degenerative phenomenon.



Treatment 3/6: IV fluid 1,000 mls @ 50 mls/hr q20H, IV Rocephin 1,000 mg x1, IV 
fluid 1,000 mls @ 50 mls/hr q20H

3/7: IV Rocephin 50 mls @ 100 mls/hr q24H, po Aricept, Lovenox sq, IV Ativan 0.5
mg prn

3/8: IM Haldol 5mg prn, PT/OT, Continue Sinemet & Comtan, Flomax, cont IV 
Rocephin



In your professional opinion, can you please clarify the specific type of 
Encephalopathy, if known?  



    Metabolic Encephalopathy

    Toxic Encephalopathy

    Other, please specify____________

    Unable to determine





(Last Revision: April 2018)



Toxic encephalopathy

___________________________________________________________________________

MTDD

## 2021-03-10 RX ADMIN — DONEPEZIL HYDROCHLORIDE SCH MG: 10 TABLET ORAL at 20:40

## 2021-03-10 RX ADMIN — ENTACAPONE SCH MG: 200 TABLET, FILM COATED ORAL at 12:59

## 2021-03-10 RX ADMIN — CARBIDOPA AND LEVODOPA SCH EACH: 25; 100 TABLET ORAL at 09:27

## 2021-03-10 RX ADMIN — ASPIRIN 81 MG CHEWABLE TABLET SCH MG: 81 TABLET CHEWABLE at 08:36

## 2021-03-10 RX ADMIN — ENTACAPONE SCH MG: 200 TABLET, FILM COATED ORAL at 16:13

## 2021-03-10 RX ADMIN — CARBIDOPA AND LEVODOPA SCH EACH: 25; 100 TABLET ORAL at 12:59

## 2021-03-10 RX ADMIN — ENTACAPONE SCH MG: 200 TABLET, FILM COATED ORAL at 08:35

## 2021-03-10 RX ADMIN — TAMSULOSIN HYDROCHLORIDE SCH MG: 0.4 CAPSULE ORAL at 08:35

## 2021-03-10 RX ADMIN — ENOXAPARIN SODIUM SCH MG: 40 INJECTION SUBCUTANEOUS at 08:36

## 2021-03-10 RX ADMIN — CARBIDOPA AND LEVODOPA SCH EACH: 25; 100 TABLET ORAL at 16:13

## 2021-03-10 NOTE — P.PN
Progress Note - Text


Progress Note Date: 03/10/21





Chief Complaint: Increasing confusion


History of presenting complaint:


This is a pleasant 71-year-old patient was chronic stable medical conditions 

include osteoarthritis, Parkinson's disease, kidney dysfunction.  Patient is 

brought in by the EMS.  Patient does told the EMS that patient been having more 

difficulty in walking for the [place] because so in patient with a small wart of 

blood diseases urine today.  Dipstick was positive for an active UTI.  Patient 

been having frequent urination.  He has Parkinson's disease and is activity has 

been getting worse.  He's been requiring more assistance because of weakness and

unstable while walking.  Patient himself is not able to give much of her 

history.  He knows that is the hospital.  Not sure exactly why he is here.  Slow

to answer questions.


Admitted with acute UTI with cystitis.  Worsening Parkinson disorder from the 

same.  Also to acute delirium from the same.  Started IV ceftriaxone.  Blood and

urine cultures both positive for E. coli.


Today-patient has continued to improve.  Sitting up.  Eating better.  More 

awake.  Did work with therapy.





Review of systems: Was done for constitutional, cardiovascular, GI, pulmonary. 

relevant finding as above





Active Medications





Amantadine HCl (Amantadine Hcl 100 Mg Cap)  100 mg PO TID [facility]


   Last Admin: 03/10/21 20:40 Dose:  100 mg


   Documented by: 


Aspirin (Aspirin 81 Mg)  81 mg PO DAILY [facility]


   Last Admin: 03/10/21 08:36 Dose:  81 mg


   Documented by: 


Carbidopa/Levodopa (Carbidopa-Levodopa  Mg 1 Each Tab)  2 each PO 

TID@0800,1200,1600 [facility]


   Last Admin: 03/10/21 16:13 Dose:  2 each


   Documented by: 


Donepezil HCl (Donepezil 10 Mg Tab)  10 mg PO DAILY@2000 [facility]


   Last Admin: 03/10/21 20:40 Dose:  10 mg


   Documented by: 


Enoxaparin Sodium (Enoxaparin 40 Mg/0.4 Ml Syringe)  40 mg SQ DAILY [facility]


   Last Admin: 03/10/21 08:36 Dose:  40 mg


   Documented by: 


Entacapone (Entacapone 200 Mg Tab)  200 mg PO TID@0800,1200,1600 [facility]


   Last Admin: 03/10/21 16:13 Dose:  200 mg


   Documented by: 


Sodium Chloride (Saline 0.9%)  1,000 mls @ 50 mls/hr IV .Q20H [facility]


   Last Admin: 03/09/21 20:53 Dose:  50 mls/hr


   Documented by: 


Ceftriaxone Sodium 1 gm/ (Sodium Chloride)  50 mls @ 100 mls/hr IVPB Q24HR [facility]


   Last Admin: 03/10/21 08:36 Dose:  100 mls/hr


   Documented by: 


Lorazepam (Lorazepam 2 Mg/Ml Inj)  0.5 mg IV Q6HR PRN


   PRN Reason: Anxiety


   Last Admin: 03/07/21 22:57 Dose:  0.5 mg


   Documented by: 


Naloxone HCl (Naloxone 0.4 Mg/Ml 1 Ml Vial)  0.2 mg IV Q2M PRN


   PRN Reason: Opioid Reversal


Polyethylene Glycol (Polyethylene Glycol 3350 17 Gm Powd.Pack)  17 gm PO DAILY 

PRN


   PRN Reason: Constipation


   Last Admin: 03/07/21 15:48 Dose:  17 gm


   Documented by: 


Senna (Sennosides 8.6 Mg Tab)  16.2 mg PO DAILY PRN


   PRN Reason: Constipation


Tamsulosin HCl (Tamsulosin 0.4 Mg Cap.Er.24h)  0.4 mg PO DAILY@0800 [facility]


   Last Admin: 03/10/21 08:35 Dose:  0.4 mg


   Documented by: 











Past medical history to include:


Osteoarthritis, Parkinson, gait dysfunction, BPH, cognitive impairment





Social history:


No history of smoking.  Alcohol occasional.





Physical examination:


VITAL SIGNS: 97, 75, 16, 101/57, 97% on room air


GENERAL: Sitting up in a chair, tired


EYES: Pupils equal.  Conjunctiva normal.


HEENT: External appearance of nose and ears normal, oral cavity grossly normal.


NECK: JVD not raised; masses not palpable.


HEART: First and second heart sounds are normal;  no edema.  


LUNGS: Respiratory rate normal; clear to auscultation.  


ABDOMEN: Soft,  nontender, liver spleen not palpable, no masses palpable.  


PSYCH: Able to answer occasional question.  


MUSCULAR skeletal: Evidence of OA. 


NEUROLOGICAL: [Cranial nerves grossly intact; slow movements and some tremors . 

Slow speech








INVESTIGATIONS, reviewed in the clinical context:


March 9: WBC 9 hemoglobin 14 potassium 4.3 creatinine 0.69 pro-calcitonin 0.19


Urine culture positive for E. coli.  Blood culture positive for E. coli.  From 

March 6.


WBC 7.7 hemoglobin 12.7 platelets 139 potassium 4 creatinine 0.67


UA positive for blood, leukoesterase, WBC, bacteria


Coronavirus [PCR]-not detected





Assessment and plan:


- acute on chronic medical debility worsening from underlying Parkinson's 

disease secondary to acute UTI.  PTOT


-Acute UTI with cystitis.  IV ceftriaxone.  Both blood and urine culture come 

back positive for E. coli.


-Primary osteoarthritis, use Tylenol when necessary


-Idiopathic Parkinson disorder, with acute deterioration due to underlying UTI. 

 Continue with Sinemet and Comtan


-Chronic gait dysfunction


-Cognitive impairment from Parkinson disease,


-Possible acute delirium from underlying infection


-Bladder outflow obstruction continue with Flomax


-DVT prophylaxis, subcu Lovenox





Continue with IV ceftriaxone.  Called patient's wife on the phone.  Did speak at

 length to her.  Did explain that patient's dose is rather guarded given his 

Parkinson's disease.  These patients are quite prone to infections.  Patient may

 need inpatient rehab.  Depending on how physical therapy dose.  He is otherwise

 at the assisted living with good support.


Total time spent about 40 minutes with over 25 minutes of discussion

## 2021-03-11 RX ADMIN — TAMSULOSIN HYDROCHLORIDE SCH MG: 0.4 CAPSULE ORAL at 23:58

## 2021-03-11 RX ADMIN — CARBIDOPA AND LEVODOPA SCH EACH: 25; 100 TABLET ORAL at 11:22

## 2021-03-11 RX ADMIN — TAMSULOSIN HYDROCHLORIDE SCH MG: 0.4 CAPSULE ORAL at 08:56

## 2021-03-11 RX ADMIN — DONEPEZIL HYDROCHLORIDE SCH MG: 10 TABLET ORAL at 20:53

## 2021-03-11 RX ADMIN — CARBIDOPA AND LEVODOPA SCH EACH: 25; 100 TABLET ORAL at 15:49

## 2021-03-11 RX ADMIN — ENTACAPONE SCH MG: 200 TABLET, FILM COATED ORAL at 08:55

## 2021-03-11 RX ADMIN — ENTACAPONE SCH MG: 200 TABLET, FILM COATED ORAL at 11:22

## 2021-03-11 RX ADMIN — ENTACAPONE SCH MG: 200 TABLET, FILM COATED ORAL at 15:49

## 2021-03-11 RX ADMIN — CEFAZOLIN SCH MLS/HR: 330 INJECTION, POWDER, FOR SOLUTION INTRAMUSCULAR; INTRAVENOUS at 08:54

## 2021-03-11 RX ADMIN — CARBIDOPA AND LEVODOPA SCH EACH: 25; 100 TABLET ORAL at 08:55

## 2021-03-11 RX ADMIN — CEFAZOLIN SCH MLS/HR: 330 INJECTION, POWDER, FOR SOLUTION INTRAMUSCULAR; INTRAVENOUS at 04:54

## 2021-03-11 RX ADMIN — ASPIRIN 81 MG CHEWABLE TABLET SCH MG: 81 TABLET CHEWABLE at 08:55

## 2021-03-11 RX ADMIN — ENOXAPARIN SODIUM SCH MG: 40 INJECTION SUBCUTANEOUS at 08:55

## 2021-03-11 NOTE — P.PN
Progress Note - Text


Progress Note Date: 03/11/21





Chief Complaint: Increasing confusion


History of presenting complaint:


This is a pleasant 71-year-old patient was chronic stable medical conditions 

include osteoarthritis, Parkinson's disease, kidney dysfunction.  Patient is 

brought in by the EMS.  Patient does told the EMS that patient been having more 

difficulty in walking for the [place] because so in patient with a small wart of 

blood diseases urine today.  Dipstick was positive for an active UTI.  Patient 

been having frequent urination.  He has Parkinson's disease and is activity has 

been getting worse.  He's been requiring more assistance because of weakness and

unstable while walking.  Patient himself is not able to give much of her 

history.  He knows that is the hospital.  Not sure exactly why he is here.  Slow

to answer questions.


Admitted with acute UTI with cystitis.  Worsening Parkinson disorder from the 

same.  Also to acute delirium from the same.  Started IV ceftriaxone.  Blood and

urine cultures both positive for E. coli.


Today-sitting up to chair.  Oral intake better.  Tremors present.  Answering 

questions.





Review of systems: Was done for constitutional, cardiovascular, GI, pulmonary. 

relevant finding as above





Active Medications





Amantadine HCl (Amantadine Hcl 100 Mg Cap)  100 mg PO TID [facility]


   Last Admin: 03/11/21 20:54 Dose:  100 mg


   Documented by: 


Aspirin (Aspirin 81 Mg)  81 mg PO DAILY [facility]


   Last Admin: 03/11/21 08:55 Dose:  81 mg


   Documented by: 


Carbidopa/Levodopa (Carbidopa-Levodopa  Mg 1 Each Tab)  2 each PO 

TID@0800,1200,1600 [facility]


   Last Admin: 03/11/21 15:49 Dose:  2 each


   Documented by: 


Carbidopa/Levodopa (Carbidopa-Levodopa  Mg 1 Each Tab)  1 each PO 

TID@0800,1200,1600 [facility]


   Last Admin: 03/11/21 15:49 Dose:  1 each


   Documented by: 


Donepezil HCl (Donepezil 10 Mg Tab)  10 mg PO DAILY@2000 [facility]


   Last Admin: 03/11/21 20:53 Dose:  10 mg


   Documented by: 


Enoxaparin Sodium (Enoxaparin 40 Mg/0.4 Ml Syringe)  40 mg SQ DAILY [facility]


   Last Admin: 03/11/21 08:55 Dose:  40 mg


   Documented by: 


Entacapone (Entacapone 200 Mg Tab)  200 mg PO TID@0800,1200,1600 [facility]


   Last Admin: 03/11/21 15:49 Dose:  200 mg


   Documented by: 


Sodium Chloride (Saline 0.9%)  1,000 mls @ 50 mls/hr IV .Q20H [facility]


   Last Admin: 03/11/21 08:54 Dose:  50 mls/hr


   Documented by: 


Ceftriaxone Sodium 1 gm/ (Sodium Chloride)  50 mls @ 100 mls/hr IVPB Q24HR [facility]


   Last Admin: 03/11/21 08:56 Dose:  100 mls/hr


   Documented by: 


Lorazepam (Lorazepam 2 Mg/Ml Inj)  0.5 mg IV Q6HR PRN


   PRN Reason: Anxiety


   Last Admin: 03/07/21 22:57 Dose:  0.5 mg


   Documented by: 


Naloxone HCl (Naloxone 0.4 Mg/Ml 1 Ml Vial)  0.2 mg IV Q2M PRN


   PRN Reason: Opioid Reversal


Polyethylene Glycol (Polyethylene Glycol 3350 17 Gm Powd.Pack)  17 gm PO DAILY 

PRN


   PRN Reason: Constipation


   Last Admin: 03/07/21 15:48 Dose:  17 gm


   Documented by: 


Senna (Sennosides 8.6 Mg Tab)  16.2 mg PO DAILY PRN


   PRN Reason: Constipation


Tamsulosin HCl (Tamsulosin 0.4 Mg Cap.Er.24h)  0.4 mg PO BID [facility]

















Past medical history to include:


Osteoarthritis, Parkinson, gait dysfunction, BPH, cognitive impairment





Social history:


No history of smoking.  Alcohol occasional.





Physical examination:


VITAL SIGNS: 97.9, 55, 17, 140s over 65, 98% on room air


GENERAL: Sitting up in a chair, awake


EYES: Pupils equal.  Conjunctiva normal.


HEENT: External appearance of nose and ears normal, oral cavity grossly normal.


NECK: JVD not raised; masses not palpable.


HEART: First and second heart sounds are normal;  no edema.  


LUNGS: Respiratory rate normal; clear to auscultation.  


ABDOMEN: Soft,  nontender, liver spleen not palpable, no masses palpable.  


PSYCH: Able to answer questions.  


MUSCULAR skeletal: Evidence of OA. 


NEUROLOGICAL: [Cranial nerves grossly intact; slow movements and some tremors . 

Slow speech








INVESTIGATIONS, reviewed in the clinical context:


March 9: WBC 9 hemoglobin 14 potassium 4.3 creatinine 0.69 pro-calcitonin 0.19


Urine culture positive for E. coli.  Blood culture positive for E. coli.  From 

March 6.


WBC 7.7 hemoglobin 12.7 platelets 139 potassium 4 creatinine 0.67


UA positive for blood, leukoesterase, WBC, bacteria


Coronavirus [PCR]-not detected





Assessment and plan:


- acute on chronic medical debility worsening from underlying Parkinson's 

disease secondary to acute UTI.  PTOT


-Acute UTI with cystitis.  IV ceftriaxone.  Both blood and urine culture come 

back positive for E. coli.


-Primary osteoarthritis, use Tylenol when necessary


-Idiopathic Parkinson disorder, with acute deterioration due to underlying UTI. 

 Continue with Sinemet and Comtan


-Chronic gait dysfunction


-Cognitive impairment from Parkinson disease,


-Possible acute delirium from underlying infection


-Bladder outflow obstruction continue with Flomax


-DVT prophylaxis, subcu Lovenox





Spoke to the  today.  He had spoken to patient's wife earlier.  She

 does not want the patient to go to inpatient rehab.  Patient needs 2 more days 

of IV antibiotics.  This will need to be done here.  She could not make 

arrangements for the same.  Also discussed with the patient.  Total time spent 

about 40 minutes with over 25 minutes of discussion

## 2021-03-12 LAB
ANION GAP SERPL CALC-SCNC: 3 MMOL/L
BASOPHILS # BLD AUTO: 0 K/UL (ref 0–0.2)
BASOPHILS NFR BLD AUTO: 1 %
BUN SERPL-SCNC: 19 MG/DL (ref 9–20)
CALCIUM SPEC-MCNC: 8.7 MG/DL (ref 8.4–10.2)
CHLORIDE SERPL-SCNC: 108 MMOL/L (ref 98–107)
CO2 SERPL-SCNC: 30 MMOL/L (ref 22–30)
EOSINOPHIL # BLD AUTO: 0.4 K/UL (ref 0–0.7)
EOSINOPHIL NFR BLD AUTO: 6 %
ERYTHROCYTE [DISTWIDTH] IN BLOOD BY AUTOMATED COUNT: 4.22 M/UL (ref 4.3–5.9)
ERYTHROCYTE [DISTWIDTH] IN BLOOD: 13.1 % (ref 11.5–15.5)
GLUCOSE SERPL-MCNC: 92 MG/DL (ref 74–99)
HCT VFR BLD AUTO: 38.7 % (ref 39–53)
HGB BLD-MCNC: 12.9 GM/DL (ref 13–17.5)
LYMPHOCYTES # SPEC AUTO: 1.3 K/UL (ref 1–4.8)
LYMPHOCYTES NFR SPEC AUTO: 22 %
MCH RBC QN AUTO: 30.7 PG (ref 25–35)
MCHC RBC AUTO-ENTMCNC: 33.5 G/DL (ref 31–37)
MCV RBC AUTO: 91.6 FL (ref 80–100)
MONOCYTES # BLD AUTO: 0.4 K/UL (ref 0–1)
MONOCYTES NFR BLD AUTO: 6 %
NEUTROPHILS # BLD AUTO: 3.8 K/UL (ref 1.3–7.7)
NEUTROPHILS NFR BLD AUTO: 63 %
PLATELET # BLD AUTO: 274 K/UL (ref 150–450)
POTASSIUM SERPL-SCNC: 4.6 MMOL/L (ref 3.5–5.1)
SODIUM SERPL-SCNC: 141 MMOL/L (ref 137–145)
WBC # BLD AUTO: 6 K/UL (ref 3.8–10.6)

## 2021-03-12 RX ADMIN — CARBIDOPA AND LEVODOPA SCH EACH: 25; 100 TABLET ORAL at 08:26

## 2021-03-12 RX ADMIN — CARBIDOPA AND LEVODOPA SCH EACH: 25; 100 TABLET ORAL at 12:42

## 2021-03-12 RX ADMIN — ENTACAPONE SCH MG: 200 TABLET, FILM COATED ORAL at 17:04

## 2021-03-12 RX ADMIN — TAMSULOSIN HYDROCHLORIDE SCH MG: 0.4 CAPSULE ORAL at 08:26

## 2021-03-12 RX ADMIN — ENOXAPARIN SODIUM SCH MG: 40 INJECTION SUBCUTANEOUS at 08:25

## 2021-03-12 RX ADMIN — CARBIDOPA AND LEVODOPA SCH EACH: 25; 100 TABLET ORAL at 17:04

## 2021-03-12 RX ADMIN — CEFAZOLIN SCH MLS/HR: 330 INJECTION, POWDER, FOR SOLUTION INTRAMUSCULAR; INTRAVENOUS at 21:31

## 2021-03-12 RX ADMIN — DONEPEZIL HYDROCHLORIDE SCH MG: 10 TABLET ORAL at 21:30

## 2021-03-12 RX ADMIN — CEFAZOLIN SCH MLS/HR: 330 INJECTION, POWDER, FOR SOLUTION INTRAMUSCULAR; INTRAVENOUS at 00:00

## 2021-03-12 RX ADMIN — CARBIDOPA AND LEVODOPA SCH EACH: 25; 100 TABLET ORAL at 12:43

## 2021-03-12 RX ADMIN — ASPIRIN 81 MG CHEWABLE TABLET SCH MG: 81 TABLET CHEWABLE at 08:26

## 2021-03-12 RX ADMIN — ENTACAPONE SCH MG: 200 TABLET, FILM COATED ORAL at 08:26

## 2021-03-12 RX ADMIN — ENTACAPONE SCH MG: 200 TABLET, FILM COATED ORAL at 12:42

## 2021-03-12 RX ADMIN — TAMSULOSIN HYDROCHLORIDE SCH MG: 0.4 CAPSULE ORAL at 21:30

## 2021-03-12 NOTE — P.PN
Progress Note - Text


Progress Note Date: 03/12/21





Chief Complaint: Increasing confusion


History of presenting complaint:


This is a pleasant 71-year-old patient was chronic stable medical conditions 

include osteoarthritis, Parkinson's disease, kidney dysfunction.  Patient is 

brought in by the EMS.  Patient does told the EMS that patient been having more 

difficulty in walking for the [place] because so in patient with a small wart of 

blood diseases urine today.  Dipstick was positive for an active UTI.  Patient 

been having frequent urination.  He has Parkinson's disease and is activity has 

been getting worse.  He's been requiring more assistance because of weakness and

unstable while walking.  Patient himself is not able to give much of her 

history.  He knows that is the hospital.  Not sure exactly why he is here.  Slow

to answer questions.


Admitted with acute UTI with cystitis.  Worsening Parkinson disorder from the 

same.  Also to acute delirium   Started IV ceftriaxone.  Blood and urine 

cultures both positive for E. coli.  Wife does not want the patient to go to the

F.


Today-improving every day.  Oral intake better.  Has been working with therapy.





Review of systems: Was done for constitutional, cardiovascular, GI, pulmonary. 

relevant finding as above





Active Medications





Amantadine HCl (Amantadine Hcl 100 Mg Cap)  100 mg PO TID [facility]


   Last Admin: 03/12/21 21:30 Dose:  100 mg


   Documented by: 


Aspirin (Aspirin 81 Mg)  81 mg PO DAILY [facility]


   Last Admin: 03/12/21 08:26 Dose:  81 mg


   Documented by: 


Carbidopa/Levodopa (Carbidopa-Levodopa  Mg 1 Each Tab)  2 each PO 

TID@0800,1200,1600 [facility]


   Last Admin: 03/12/21 17:04 Dose:  2 each


   Documented by: 


Carbidopa/Levodopa (Carbidopa-Levodopa  Mg 1 Each Tab)  1 each PO 

TID@0800,1200,1600 [facility]


   Last Admin: 03/12/21 17:04 Dose:  1 each


   Documented by: 


Donepezil HCl (Donepezil 10 Mg Tab)  10 mg PO DAILY@2000 [facility]


   Last Admin: 03/12/21 21:30 Dose:  10 mg


   Documented by: 


Enoxaparin Sodium (Enoxaparin 40 Mg/0.4 Ml Syringe)  40 mg SQ DAILY [facility]


   Last Admin: 03/12/21 08:25 Dose:  40 mg


   Documented by: 


Entacapone (Entacapone 200 Mg Tab)  200 mg PO TID@0800,1200,1600 [facility]


   Last Admin: 03/12/21 17:04 Dose:  200 mg


   Documented by: 


Sodium Chloride (Saline 0.9%)  1,000 mls @ 50 mls/hr IV .Q20H [facility]


   Last Admin: 03/12/21 21:31 Dose:  50 mls/hr


   Documented by: 


Ceftriaxone Sodium 1 gm/ (Sodium Chloride)  50 mls @ 100 mls/hr IVPB Q24HR [facility]


   Last Admin: 03/12/21 08:26 Dose:  100 mls/hr


   Documented by: 


Lorazepam (Lorazepam 2 Mg/Ml Inj)  0.5 mg IV Q6HR PRN


   PRN Reason: Anxiety


   Last Admin: 03/07/21 22:57 Dose:  0.5 mg


   Documented by: 


Naloxone HCl (Naloxone 0.4 Mg/Ml 1 Ml Vial)  0.2 mg IV Q2M PRN


   PRN Reason: Opioid Reversal


Polyethylene Glycol (Polyethylene Glycol 3350 17 Gm Powd.Pack)  17 gm PO DAILY 

PRN


   PRN Reason: Constipation


   Last Admin: 03/07/21 15:48 Dose:  17 gm


   Documented by: 


Senna (Sennosides 8.6 Mg Tab)  16.2 mg PO DAILY PRN


   PRN Reason: Constipation


Tamsulosin HCl (Tamsulosin 0.4 Mg Cap.Er.24h)  0.4 mg PO BID [facility]


   Last Admin: 03/12/21 21:30 Dose:  0.4 mg


   Documented by: 











Past medical history to include:


Osteoarthritis, Parkinson, gait dysfunction, BPH, cognitive impairment





Social history:


No history of smoking.  Alcohol occasional.





Physical examination:


VITAL SIGNS: 97.4, 55, 16, 121/61, 98% room air


GENERAL: Sitting up in a chair, awake


EYES: Pupils equal.  Conjunctiva normal.


HEENT: External appearance of nose and ears normal, oral cavity grossly normal.


NECK: JVD not raised; masses not palpable.


HEART: First and second heart sounds are normal;  no edema.  


LUNGS: Respiratory rate normal; clear to auscultation.  


ABDOMEN: Soft,  nontender, liver spleen not palpable, no masses palpable.  


PSYCH: Able to answer simple questions.


MUSCULAR skeletal: Evidence of OA. 


NEUROLOGICAL: [Cranial nerves grossly intact; slow movements and some tremors . 

Slow speech








INVESTIGATIONS, reviewed in the clinical context:


March 12: WBC 16 globin 12.9 platelets 274 creatinine 0.7 to


March 9: WBC 9 hemoglobin 14 potassium 4.3 creatinine 0.69 pro-calcitonin 0.19


Urine culture positive for E. coli.  Blood culture positive for E. coli.  From 

March 6.


WBC 7.7 hemoglobin 12.7 platelets 139 potassium 4 creatinine 0.67


UA positive for blood, leukoesterase, WBC, bacteria


Coronavirus [PCR]-not detected





Assessment and plan:


- acute on chronic medical debility worsening from underlying Parkinson's 

disease secondary to acute UTI.  PTOT


-Acute UTI with cystitis.  IV ceftriaxone.  Both blood and urine culture come 

back positive for E. coli.


-Primary osteoarthritis, use Tylenol when necessary


-Idiopathic Parkinson disorder, with acute deterioration due to underlying UTI. 

 Continue with Sinemet and Comtan


-Chronic gait dysfunction


-Cognitive impairment from Parkinson disease,


-Possible acute delirium from underlying infection


-Bladder outflow obstruction continue with Flomax


-DVT prophylaxis, subcu Lovenox


-Thrombocytopenia.  From underlying infection-improving








Patient will complete 7 days of ceftriaxone tomorrow.  He should be done later 

able to go back to his assisted living.

## 2021-03-12 NOTE — P.GSCN
History of Present Illness


Consult date: 03/12/21


History of present illness: 





72 yo male with progressive parkinsons was admitted because of increasing 

difficulty with walking and an apparent uti. He has had problems with 

incontinence and was found to be in urinary retention   For this reason we were 

consulted. He has grown a pansensitive e coli and is being treated.  He was 

straight cathed for 800 ml last nite. His pvr today was about 650 ml. the 

history actually dates to last summer.  He he has been on Flomax for voiding 

dysfunction.  It has helped to a degree.  He still has had some slow stream.  

His Parkinson's dates to 2001.  He fell and had a fractured hip in the late fall

early winter.  He eventually underwent surgery to correct that.  He has had a 

prolonged postoperative course aggravated by his Parkinson's.  He is now on PlexPress.  He came in with the above-mentioned weakness.  This hospita

lization he has been having slowing stream urgency to the point of leakage.  He 

is not clear as to whether he voided significantly differently after his hip 

surgery in December.





Review of Systems


All systems: negative





- Constitutional


Reports weakness, Denies fever, Denies weight loss





- EENT


Eyes: denies blurred vision


Ears, nose, mouth and throat: Denies dysphagia





- Cardiovascular


Denies chest pain, Denies shortness of breath





- Respiratory


Denies cough, Denies 7





- Gastrointestinal


Reports as per HPI





- Genitourinary


Reports as per HPI, Reports incontinence, Reports urinary hesitancy, Denies 

dysuria, Denies hematuria





- Integumentary


Denies rash, Denies unusual bruising





- Neurological


Denies headaches, Denies syncope





- Hematologic/Lymphatic


Denies easy bleeding, Denies easy bruising





Past Medical History


Past Medical History: Musculoskeletal Disorder


Additional Past Medical History / Comment(s): Parkinson's


History of Any Multi-Drug Resistant Organisms: C-DIFF


Year Discovered:: 01/24/2017


MDRO Source:: "marginal" c-diff, pt states this is "all gone"


Past Surgical History: Orthopedic Surgery


Additional Past Surgical History / Comment(s): right knee sx, back sx, neck 

surgery


Past Anesthesia/Blood Transfusion Reactions: No Reported Reaction


Past Psychological History: No Psychological Hx Reported


Smoking Status: Never smoker


Past Alcohol Use History: Occasional


Past Drug Use History: None Reported





Medications and Allergies


                                Home Medications











 Medication  Instructions  Recorded  Confirmed  Type


 


Carbidopa/Levodopa/Entacapone 1 tab PO TID@0800,1200,1600 10/07/20 03/06/21 

History





[Carbidopa-Levodopa 200 mg-Enta]    


 


Polyethylene Glycol 3350 [Miralax] 17 gm PO DAILY PRN 10/07/20 03/06/21 History


 


Tamsulosin HCl [Flomax] 0.4 mg PO DAILY@0800 10/07/20 03/06/21 History


 


Aspirin [Adult Low Dose Aspirin EC] 81 mg PO DAILY@0800 03/06/21 03/06/21 

History


 


Carbidopa-Levodopa  mg 1 tab PO TID@0800,1200,1600 03/06/21 03/06/21 

History





[Sinemet  mg]    


 


Donepezil [Aricept] 10 mg PO DAILY@2000 03/06/21 03/06/21 History


 


Sennosides [Senna] 16.2 mg PO DAILY PRN 03/06/21 03/06/21 History


 


amantadine HCL [Amantadine] 100 mg PO TID #0 03/11/21 03/09/21 Rx


 


cefTRIAXone [Rocephin] 1 gm IVPB Q24HR #2 vial 03/11/21  Rx








                                    Allergies











Allergy/AdvReac Type Severity Reaction Status Date / Time


 


bee pollen Allergy  Unknown Verified 03/06/21 23:19


 


Penicillins AdvReac  Diarrhea Verified 03/06/21 23:19





   (C-Diff)  














Surgical - Exam


                                   Vital Signs











Temp Pulse Resp BP Pulse Ox


 


 97.1 F L  70   16   112/64   99 


 


 03/06/21 19:38  03/06/21 19:38  03/06/21 19:38  03/06/21 19:38  03/06/21 19:38














- General


well developed, chronically ill





- Eyes


PERRL





- ENT





His speech is slow.


no hearing loss





- Neck


no masses





- Respiratory


normal expansion





- Cardiovascular


Rhythm: regular





- Abdomen





Bladder is palpably full


Abdomen: soft, non tender, tender





- Genitourinary





Normal penis and testicles





- Neurologic





Per his wife he is confused however per my interview today he was fine


normal sensation





- Musculoskeletal


normal posture





- Psychiatric


oriented to time, oriented to person, oriented to place, speech is normal, 

memory intact





Results





- Labs





                                 03/12/21 05:51





                                 03/12/21 05:51


                  Abnormal Lab Results - Last 24 Hours (Table)











  03/12/21 03/12/21 Range/Units





  05:51 05:51 


 


RBC  4.22 L   (4.30-5.90)  m/uL


 


Hgb  12.9 L   (13.0-17.5)  gm/dL


 


Hct  38.7 L   (39.0-53.0)  %


 


Chloride   108 H  ()  mmol/L








                      Microbiology - Last 24 Hours (Table)











 03/06/21 21:33 Blood Culture - Preliminary





 Blood    No Growth after 120 hours








                                 Diabetes panel











  03/12/21 Range/Units





  05:51 


 


Sodium  141  (137-145)  mmol/L


 


Potassium  4.6  (3.5-5.1)  mmol/L


 


Chloride  108 H  ()  mmol/L


 


Carbon Dioxide  30  (22-30)  mmol/L


 


BUN  19  (9-20)  mg/dL


 


Creatinine  0.72  (0.66-1.25)  mg/dL


 


Glucose  92  (74-99)  mg/dL


 


Calcium  8.7  (8.4-10.2)  mg/dL








                                  Calcium panel











  03/12/21 Range/Units





  05:51 


 


Calcium  8.7  (8.4-10.2)  mg/dL








                                 Pituitary panel











  03/12/21 Range/Units





  05:51 


 


Sodium  141  (137-145)  mmol/L


 


Potassium  4.6  (3.5-5.1)  mmol/L


 


Chloride  108 H  ()  mmol/L


 


Carbon Dioxide  30  (22-30)  mmol/L


 


BUN  19  (9-20)  mg/dL


 


Creatinine  0.72  (0.66-1.25)  mg/dL


 


Glucose  92  (74-99)  mg/dL


 


Calcium  8.7  (8.4-10.2)  mg/dL








                                  Adrenal panel











  03/12/21 Range/Units





  05:51 


 


Sodium  141  (137-145)  mmol/L


 


Potassium  4.6  (3.5-5.1)  mmol/L


 


Chloride  108 H  ()  mmol/L


 


Carbon Dioxide  30  (22-30)  mmol/L


 


BUN  19  (9-20)  mg/dL


 


Creatinine  0.72  (0.66-1.25)  mg/dL


 


Glucose  92  (74-99)  mg/dL


 


Calcium  8.7  (8.4-10.2)  mg/dL














Assessment and Plan


Assessment: 





Impression: Acute urinary tract infection.  Incomplete bladder emptying acute 

versus chronic.  Chronic Parkinson's.





Recommendations: The patient is obviously having problems the urine retention.  

Whether this is acute or chronic is indeterminate.  The causes of urine 

retention including large prostate, Parkinson's, urinary tract infection, 

immobility.





The patient has been on Flomax and it has been reinstituted.  I recommend 

leaving the indwelling catheter until his medical status is stabilized.  In the 

future.  The catheter can be removed for a voiding trial but I did wait until he

 is better medically.  If he is unable to urinate then further evaluation 

including urodynamics and cystoscopy would be required to determine whether he 

would be a candidate for further intervention.


Time with Patient: Greater than 30

## 2021-03-13 VITALS
TEMPERATURE: 97.9 F | SYSTOLIC BLOOD PRESSURE: 117 MMHG | HEART RATE: 50 BPM | RESPIRATION RATE: 16 BRPM | DIASTOLIC BLOOD PRESSURE: 62 MMHG

## 2021-03-13 RX ADMIN — CARBIDOPA AND LEVODOPA SCH EACH: 25; 100 TABLET ORAL at 12:34

## 2021-03-13 RX ADMIN — ENTACAPONE SCH MG: 200 TABLET, FILM COATED ORAL at 12:34

## 2021-03-13 RX ADMIN — TAMSULOSIN HYDROCHLORIDE SCH MG: 0.4 CAPSULE ORAL at 08:16

## 2021-03-13 RX ADMIN — CARBIDOPA AND LEVODOPA SCH EACH: 25; 100 TABLET ORAL at 08:15

## 2021-03-13 RX ADMIN — ENOXAPARIN SODIUM SCH MG: 40 INJECTION SUBCUTANEOUS at 08:16

## 2021-03-13 RX ADMIN — ENTACAPONE SCH MG: 200 TABLET, FILM COATED ORAL at 08:15

## 2021-03-13 RX ADMIN — ASPIRIN 81 MG CHEWABLE TABLET SCH MG: 81 TABLET CHEWABLE at 08:16

## 2021-03-13 NOTE — P.PN
Subjective


Progress Note Date: 03/13/21





The patient was seen for urine retention and a urinary tract infection.  The 

retention is acute and chronic.  He was on Flomax and that was increased to 

twice daily.  The catheters was removed this morning for a voiding trial.  If he

voids then I would like to see him to check a urine and postvoid residual in the

office in a week or so.  If he doesn't void then the catheter should go back and

then I would see him in the office for further evaluation and potential 

treatment of his urine retention.





Objective





- Vital Signs


Vital signs: 


                                   Vital Signs











Temp  97.5 F L  03/13/21 04:50


 


Pulse  57 L  03/13/21 04:50


 


Resp  18   03/13/21 04:50


 


BP  149/74   03/13/21 04:50


 


Pulse Ox  98   03/13/21 04:50








                                 Intake & Output











 03/12/21 03/13/21 03/13/21





 18:59 06:59 18:59


 


Intake Total 1960 250 


 


Output Total 2018 1800 


 


Balance -58 -1550 


 


Weight 108.862 kg  


 


Intake:   


 


   250 


 


    Sodium Chloride 0.9% 1, 600 250 





    000 ml @ 50 mls/hr IV .   





    Q20H [facility] Rx#:[id number]   


 


  Intake, IV Titration 50  





  Amount   


 


    cefTRIAXone 1 gm In 50  





    Sodium Chloride 0.9% 50   





    ml @ 100 mls/hr IVPB   





    Q24HR [facility] Rx#:[id number]   


 


  Oral 1310  


 


Output:   


 


  Urine 1350 1800 


 


  Post Void Residual 668  


 


Other:   


 


  Voiding Method Urinal Indwelling Catheter Toilet





 Diaper  


 


  # Voids 5  


 


  # Bowel Movements 1  














- Labs


CBC & Chem 7: 


                                 03/12/21 05:51





                                 03/12/21 05:51


Labs: 


                      Microbiology - Last 24 Hours (Table)











 03/06/21 21:33 Blood Culture - Final





 Blood    No Growth after 144 hours

## 2021-03-14 NOTE — P.DS
Providers


Date of admission: 


03/07/21 19:37





Expected date of discharge: 03/14/21


Attending physician: 


Sherwin Rueda





Consults: 





                                        





03/12/21 12:09


Consult Physician Routine 


   Consulting Provider: Moe Pang


   Consult Reason/Comments: Urinary retention


   Do you want consulting provider notified?: Yes











Primary care physician: 


Scottie Austin MD





Hospital Course: 





Chief Complaint: Increasing confusion


History of presenting complaint:


This is a pleasant 71-year-old patient was chronic stable medical conditions 

include osteoarthritis, Parkinson's disease, kidney dysfunction.  Patient is 

brought in by the EMS.  Patient does told the EMS that patient been having more 

difficulty in walking for .  Dipstick was positive for an active UTI.  Patient 

been having frequent urination.  He has Parkinson's disease and is activity has 

been getting worse.  He's been requiring more assistance because of weakness and

unstable while walking.  Patient himself is not able to give much of her 

history.  He knows that is the hospital.  Not sure exactly why he is here.  Slow

to answer questions.


Admitted with acute UTI with cystitis.  Worsening Parkinson disorder from the 

same.  Also  acute delirium   Started IV ceftriaxone.  Blood and urine cultures 

both positive for E. coli.  Wife does not want the patient to go to the F.


Patient gradually did improve.  I spoke to patient's wife on the phone.  Patient

has some underlying cognitive impairment.  Eventually long-term care at some 

point.  Currently at assisted living.  Patient did complete his course of 

antibiotic.  Patient was seen by Dr. Ashton for urinary retention.  Dose of 

Flomax was increased.


Discussion and discharge planning more than 35 minutes





Consultation:


Dr. Ashton from urology











Past medical history to include:


Osteoarthritis, Parkinson, gait dysfunction, BPH, cognitive impairment





Social history:


No history of smoking.  Alcohol occasional.





Physical examination:


VITAL SIGNS: 97.5, 57, 18, 149/74, 98% room air


GENERAL: Sitting up in a chair, awake


EYES: Pupils equal.  Conjunctiva normal.


HEENT: External appearance of nose and ears normal, oral cavity grossly normal.


NECK: JVD not raised; masses not palpable.


HEART: First and second heart sounds are normal;  no edema.  


LUNGS: Respiratory rate normal; clear to auscultation.  


ABDOMEN: Soft,  nontender, liver spleen not palpable, no masses palpable.  


PSYCH: Able to answer simple questions.


MUSCULAR skeletal: Evidence of OA. 


NEUROLOGICAL: [Cranial nerves grossly intact; slow movements and some tremors . 

Slow speech








INVESTIGATIONS, reviewed in the clinical context:


March 12: WBC 16 globin 12.9 platelets 274 creatinine 0.7 to


March 9: WBC 9 hemoglobin 14 potassium 4.3 creatinine 0.69 pro-calcitonin 0.19


Urine culture positive for E. coli.  Blood culture positive for E. coli.  From 

March 6.


WBC 7.7 hemoglobin 12.7 platelets 139 potassium 4 creatinine 0.67


UA positive for blood, leukoesterase, WBC, bacteria


Coronavirus [PCR]-not detected





Assessment and plan:


- acute on chronic medical debility worsening from underlying Parkinson's 

disease secondary to acute UTI.  PTOT


-Acute UTI with cystitis.  IV ceftriaxone.  Both blood and urine culture come 

back positive for E. coli.  Completed course of IV antibiotic


-Primary osteoarthritis, use Tylenol when necessary


-Idiopathic Parkinson disorder, with acute deterioration due to underlying UTI. 

 Continue with Sinemet and Comtan


-Chronic gait dysfunction


-Cognitive impairment from Parkinson disease,


-Possible acute delirium from underlying infection


-Bladder outflow obstruction continue with Flomax.  dose increased


-DVT prophylaxis, subcu Lovenox


-Thrombocytopenia.  From underlying infection-improving





Disposition:


Assisted-living-[facility]





Plan - Discharge Summary


Discharge Rx Participant: Yes


New Discharge Prescriptions: 


Continue


   Polyethylene Glycol 3350 [Miralax] 17 gm PO DAILY PRN


     PRN Reason: Constipation


   Carbidopa/Levodopa/Entacapone [Carbidopa-Levodopa 200 mg-Enta] 1 tab PO 

TID@0800,1200,1600


   Donepezil [Aricept] 10 mg PO DAILY@2000


   Carbidopa-Levodopa  mg [Sinemet  mg] 1 tab PO TID@0800,1200,1600


   Aspirin [Adult Low Dose Aspirin EC] 81 mg PO DAILY@0800


   Sennosides [Senna] 16.2 mg PO DAILY PRN


     PRN Reason: Constipation





Changed


   amantadine HCL [Amantadine] 100 mg PO TID #0


   Tamsulosin HCl [Flomax] 0.4 mg PO BID #60 cap





Discontinued


   Ibuprofen 400 mg PO AS DIRECTED


   Ibuprofen [Advil] 200 - 400 mg PO DAILY PRN


     PRN Reason: Fever And/ Or Pain


Discharge Medication List





Carbidopa/Levodopa/Entacapone [Carbidopa-Levodopa 200 mg-Enta] 1 tab PO 

TID@0800,1200,1600 10/07/20 [History]


Polyethylene Glycol 3350 [Miralax] 17 gm PO DAILY PRN 10/07/20 [History]


Aspirin [Adult Low Dose Aspirin EC] 81 mg PO DAILY@0800 03/06/21 [History]


Carbidopa-Levodopa  mg [Sinemet  mg] 1 tab PO TID@0800,1200,1600 

03/06/21 [History]


Donepezil [Aricept] 10 mg PO DAILY@2000 03/06/21 [History]


Sennosides [Senna] 16.2 mg PO DAILY PRN 03/06/21 [History]


amantadine HCL [Amantadine] 100 mg PO TID #0 03/11/21 [Rx]


Tamsulosin HCl [Flomax] 0.4 mg PO BID #60 cap 03/13/21 [Rx]








Follow up Appointment(s)/Referral(s): 


neurologist-dr elis [Other] - 10 Days


Scottie Austin MD [Primary Care Provider] - 1-2 days


Moe Pang MD [STAFF PHYSICIAN] - 1 Week


Patient Instructions/Handouts:  Urinary Tract Infection in Men (ED)


Discharge Disposition: HOME SELF-CARE

## 2022-10-18 ENCOUNTER — HOSPITAL ENCOUNTER (EMERGENCY)
Dept: HOSPITAL 47 - EC | Age: 72
Discharge: HOME | End: 2022-10-18
Payer: MEDICARE

## 2022-10-18 VITALS
SYSTOLIC BLOOD PRESSURE: 132 MMHG | DIASTOLIC BLOOD PRESSURE: 59 MMHG | TEMPERATURE: 97.5 F | RESPIRATION RATE: 18 BRPM | HEART RATE: 73 BPM

## 2022-10-18 DIAGNOSIS — Z88.0: ICD-10-CM

## 2022-10-18 DIAGNOSIS — Z23: ICD-10-CM

## 2022-10-18 DIAGNOSIS — S00.81XA: Primary | ICD-10-CM
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
## 2022-10-18 DIAGNOSIS — Z91.030: ICD-10-CM

## 2022-10-18 PROCEDURE — 90471 IMMUNIZATION ADMIN: CPT

## 2022-10-18 PROCEDURE — 70486 CT MAXILLOFACIAL W/O DYE: CPT

## 2022-10-18 PROCEDURE — 99284 EMERGENCY DEPT VISIT MOD MDM: CPT

## 2022-10-18 PROCEDURE — 72125 CT NECK SPINE W/O DYE: CPT

## 2022-10-18 PROCEDURE — 90715 TDAP VACCINE 7 YRS/> IM: CPT

## 2022-10-18 PROCEDURE — 70450 CT HEAD/BRAIN W/O DYE: CPT

## 2022-10-18 NOTE — CT
EXAMINATION TYPE: CT brain cspine wo con

 

DATE OF EXAM: 10/18/2022

 

COMPARISON: CT 3/6/2021

 

HISTORY: fall 4 days ago

 

CT DLP: combined DLP 1261 mGycm

Automated exposure control for dose reduction was used.

 

TECHNIQUE: CT scan of the head and cervical spine are performed without contrast.

 

FINDINGS: There is artifact on the exams.

 

  There is no acute intracranial hemorrhage, mass effect, or midline shift identified.  The ventricle
s and sulci are within normal limits in size.  The globes are intact and the visualized sinuses are c
lear. Brain shows a stable appearance. There is cortical atrophy. Some periventricular white matter l
ow-attenuation is suspected suggestive of chronic small vessel ischemic changes.

 

Cervical spine is visualized in its entirety from C1 through upper thoracic levels and demonstrates s
table alignment without evidence of acute fracture or dislocation. Posterior fusion changes, multilev
el laminectomies are again noted, there is artifact due to patient's metallic hardware. Loss of disc 
height is present at intervertebral levels. Multilevel facet arthropathy changes present. There is mu
ltilevel foraminal encroachment. Prevertebral soft tissue appears within normal limits.  Multilevel i
ntervertebral loss of disc height. Multilevel spondylosis. Cervical vertebral bodies show preserved h
eight. The C1-C2 articulation is unremarkable.  

 

IMPRESSION:

1. There is no acute fracture or dislocation evident in the cervical spine, there are postop changes 
and additional findings above..

2. No acute intracranial hemorrhage, mass effect, or midline shift is seen.

## 2022-10-18 NOTE — ED
Fall HPI





- General


Chief Complaint: Fall


Stated Complaint: fall - head injury


Time Seen by Provider: 10/18/22 11:35


Source: patient, family, RN notes reviewed, old records reviewed


Mode of arrival: ambulatory


Limitations: physical limitation (Parkinson's)





- History of Present Illness


Initial Comments: 





72-year-old male, alert and oriented 4 presents to the emergency room with 

complaints of a trip and fall on a step at home 4 days ago.  Wife states that 

she was there and witnessed the fall and he did not lose consciousness.  She 

states that he is normally unsteady due to his Parkinson's and seen him lose his

balance as he reached for a package on the ground. He did hit his left side of 

his forehead and sustained an abrasion.  She did call primary care doctor's 

office to going for evaluation


and was recommended he come to the emergency room.  Patient denies any pain.  No

other symptoms.  Does not take any blood thinners.  No other medical history 

other than Parkinson's.


MD Complaint: fall


-: days(s) (4)


Fall From: standing


When Fall Occurred: # days PTA (4)


Fall Witnessed: yes, by family


Place Fall Occurred: home


Loss of Consciousness: none


Prolonged Down Time?: no


Symptoms Prior to Fall: none


Location: head


Severity scale (1-10): 0


Context: tripped/slipped (Parkinson's)


Associated Symptoms: denies





- Related Data


                                Home Medications











 Medication  Instructions  Recorded  Confirmed


 


Carbidopa/Levodopa/Entacapone 1 tab PO TID@0800,1200,1600 10/07/20 10/18/22





[Carbidopa-Levodopa 200 mg-Enta]   


 


polyethylene glycoL 3350 [Miralax] 17 gm PO DAILY PRN 10/07/20 10/18/22


 


Carbidopa-Levodopa  mg 1 tab PO DAILY PRN 03/06/21 10/18/22





[Sinemet  mg]   


 


Sennosides [Senna] 16.2 mg PO DAILY PRN 03/06/21 10/18/22


 


Donepezil HCl [Aricept] 10 mg PO DAILY@1600 10/18/22 10/18/22


 


Tamsulosin HCl [Flomax] 0.4 mg PO DAILY@1600 10/18/22 10/18/22


 


amantadine HCL [Amantadine] 100 mg PO BID@0800,1600 10/18/22 10/18/22











                                    Allergies











Allergy/AdvReac Type Severity Reaction Status Date / Time


 


bee pollen Allergy  Unknown Verified 10/18/22 12:12


 


Penicillins AdvReac  Diarrhea Verified 10/18/22 12:12





   (C-Diff)  














Review of Systems


ROS Statement: 


Those systems with pertinent positive or pertinent negative responses have been 

documented in the HPI.





ROS Other: All systems not noted in ROS Statement are negative.





Past Medical History


Past Medical History: Musculoskeletal Disorder


Additional Past Medical History / Comment(s): Parkinson's


History of Any Multi-Drug Resistant Organisms: C-DIFF


Date of last positivie culture/infection: 01/24/2017


MDRO Source:: "marginal" c-diff, pt states this is "all gone"


Past Surgical History: Orthopedic Surgery


Additional Past Surgical History / Comment(s): right knee sx, back sx, neck 

surgery


Past Anesthesia/Blood Transfusion Reactions: No Reported Reaction


Past Psychological History: No Psychological Hx Reported


Smoking Status: Never smoker


Past Alcohol Use History: Occasional


Past Drug Use History: None Reported





General Exam


Limitations: no limitations


General appearance: alert, in no apparent distress


Head exam: Present: normocephalic, other (4 x 5 cm abrasion noted to the left 

forehead, ecchymosis surrounding left eye, no swelling or conjunctival 

injection)


  ** Expanded


Head exam: Present: abrasion.  Absent: hematoma, york's sign, general 

tenderness, tenderness of temporal artery, CSF rhinorrhea, CSF otorrhea


Eye exam: Present: normal appearance, PERRL.  Absent: scleral icterus, 

conjunctival injection, nystagmus, periorbital swelling


ENT exam: Present: mucous membranes moist


  ** Expanded


Mouth exam: Present: tongue normal, tongue elevation.  Absent: drooling, 

trismus, muffled voice


Neck exam: Present: normal inspection, other (cspine surgical scar).  Absent: 

tenderness, meningismus


Respiratory exam: Absent: respiratory distress, accessory muscle use


Cardiovascular Exam: Present: regular rate


GI/Abdominal exam: Present: soft.  Absent: distended, tenderness, rigid


Extremities exam: Present: normal capillary refill.  Absent: pedal edema


Neurological exam: Present: alert, oriented X3


  ** Expanded


Patient oriented to: Present: person, place, time


Speech: Present: fluid speech


Cranial nerves: EOM's Intact: Normal, Gag Reflex: Normal, Tongue Deviation: 

Normal


Cerebellar function: Finger to Nose: Normal


Motor strength exam: RUE: 5, LUE: 5, RLE: 5, LLE: 5


Eye Response: (4) open spontaneously


Motor Response: (6) obeys commands


Verbal Response: (5) oriented


Mariela Total: 15


Psychiatric exam: Present: normal affect, normal mood


Skin exam: Present: warm, dry, normal color.  Absent: cyanosis, diaphoretic, 

petechiae, pallor





Course


                                   Vital Signs











  10/18/22





  11:00


 


Temperature 97.5 F L


 


Pulse Rate 73


 


Respiratory 18





Rate 


 


Blood Pressure 132/59


 


O2 Sat by Pulse 95





Oximetry 














Medical Decision Making





- Medical Decision Making


Due to patient's Parkinson's he tripped over a step on Friday and hit the left 

side of his head.  No loss of consciousness.  There is an abrasion to the left 

side of his forehead with bruising around his left eye.  No pain with eye 

movement or visual changes.  Denies any headaches.  Patient denies any other 

injuries at this time.  Does take aspirin a day with a blood thinners.  Wife 

states that they did try to contact primary care doctor's office for evaluation 

and they recommended he come to the emergency room.


CT brain and C-spine show no acute intracranial hemorrhage, mass effect or 

midline shift.  


CT C-spine shows postop changes with multilevel laminectomies C1 through upper 

thoracic.


CT of the facial bones show no acute fractures.  Facial bones intact.  


Vital signs are stable.  No focal neurological deficits.  Patient does have 

tremors from his parkinsons.  Vital signs are stable.


Patient denies any complaints of pain and no other injuries.  Neosporin dressing

 was applied to forehead abrasion. 


They were directed to follow up with primary doctor next week.  Return to the 

emergency room with any new or concerning symptoms.  Tetanus shot was updated at

 this visit.  Case discussed with Dr. Hayes





Disposition


Clinical Impression: 


 Fall, Facial abrasion





Disposition: HOME SELF-CARE


Condition: Good


Instructions (If sedation given, give patient instructions):  Fall Prevention 

for Older Adults (ED), Abrasion (ED)


Additional Instructions: 


Put Neosporin ointment on facial abrasion once a day for the next 2 days.  

Follow-up with the primary care doctor this week.  Return to the emergency room 

with any new or concerning symptoms.


Is patient prescribed a controlled substance at d/c from ED?: No


Referrals: 


Scottie Austin MD [Primary Care Provider] - 1-2 days


Time of Disposition: 12:51

## 2022-10-18 NOTE — CT
EXAMINATION TYPE: CT facial bones wo con

 

DATE OF EXAM: 10/18/2022

 

COMPARISON: CT facial bones 19 2019

 

HISTORY: fall 4 days ago trauma and pain

 

CT DLP: combined DLP 1261 mGycm

Automated exposure control for dose reduction was used.

 

TECHNIQUE: CT scan of the sinuses is performed without contrast, axial images are obtained, coronal r
eformatted images are also reviewed.

 

FINDINGS: Dental amalgam causes streak artifact over the exam. The possible mucus retention cyst at t
he right maxillary sinus antrum and left maxillary sinus are stable. Facial bones are intact. No air-
fluid level in the visualized sinuses to suggest acute hemorrhage.

 

IMPRESSION: No acute fracture.

## 2023-10-19 ENCOUNTER — HOSPITAL ENCOUNTER (EMERGENCY)
Dept: HOSPITAL 47 - EC | Age: 73
Discharge: HOME | End: 2023-10-19
Payer: MEDICARE

## 2023-10-19 VITALS — SYSTOLIC BLOOD PRESSURE: 135 MMHG | DIASTOLIC BLOOD PRESSURE: 74 MMHG | HEART RATE: 78 BPM

## 2023-10-19 VITALS — RESPIRATION RATE: 18 BRPM | TEMPERATURE: 99.3 F

## 2023-10-19 DIAGNOSIS — G20.A1: Primary | ICD-10-CM

## 2023-10-19 DIAGNOSIS — Z91.030: ICD-10-CM

## 2023-10-19 DIAGNOSIS — F02.80: ICD-10-CM

## 2023-10-19 DIAGNOSIS — Z88.0: ICD-10-CM

## 2023-10-19 LAB
ALBUMIN SERPL-MCNC: 3.5 G/DL (ref 3.5–5)
ALP SERPL-CCNC: 77 U/L (ref 38–126)
ALT SERPL-CCNC: 20 U/L (ref 4–49)
ANION GAP SERPL CALC-SCNC: 8 MMOL/L
AST SERPL-CCNC: 34 U/L (ref 17–59)
BASOPHILS # BLD AUTO: 0 K/UL (ref 0–0.2)
BASOPHILS NFR BLD AUTO: 0 %
BUN SERPL-SCNC: 21 MG/DL (ref 9–20)
CALCIUM SPEC-MCNC: 8 MG/DL (ref 8.4–10.2)
CHLORIDE SERPL-SCNC: 106 MMOL/L (ref 98–107)
CO2 SERPL-SCNC: 21 MMOL/L (ref 22–30)
EOSINOPHIL # BLD AUTO: 0 K/UL (ref 0–0.7)
EOSINOPHIL NFR BLD AUTO: 1 %
ERYTHROCYTE [DISTWIDTH] IN BLOOD BY AUTOMATED COUNT: 4.37 M/UL (ref 4.3–5.9)
ERYTHROCYTE [DISTWIDTH] IN BLOOD: 12.8 % (ref 11.5–15.5)
GLUCOSE SERPL-MCNC: 85 MG/DL (ref 74–99)
HCT VFR BLD AUTO: 41.4 % (ref 39–53)
HGB BLD-MCNC: 14.1 GM/DL (ref 13–17.5)
LYMPHOCYTES # SPEC AUTO: 0.9 K/UL (ref 1–4.8)
LYMPHOCYTES NFR SPEC AUTO: 15 %
MAGNESIUM SPEC-SCNC: 1.7 MG/DL (ref 1.6–2.3)
MCH RBC QN AUTO: 32.2 PG (ref 25–35)
MCHC RBC AUTO-ENTMCNC: 34 G/DL (ref 31–37)
MCV RBC AUTO: 94.8 FL (ref 80–100)
MONOCYTES # BLD AUTO: 0.6 K/UL (ref 0–1)
MONOCYTES NFR BLD AUTO: 10 %
NEUTROPHILS # BLD AUTO: 4.1 K/UL (ref 1.3–7.7)
NEUTROPHILS NFR BLD AUTO: 73 %
PH UR: 6 [PH] (ref 5–8)
PLATELET # BLD AUTO: 107 K/UL (ref 150–450)
POTASSIUM SERPL-SCNC: 4.2 MMOL/L (ref 3.5–5.1)
PROT SERPL-MCNC: 6.1 G/DL (ref 6.3–8.2)
RBC UR QL: 5 /HPF (ref 0–5)
SODIUM SERPL-SCNC: 135 MMOL/L (ref 137–145)
SP GR UR: 1.02 (ref 1–1.03)
UROBILINOGEN UR QL STRIP: <2 MG/DL (ref ?–2)
WBC # BLD AUTO: 5.7 K/UL (ref 3.8–10.6)
WBC # UR AUTO: <1 /HPF (ref 0–5)

## 2023-10-19 PROCEDURE — 80053 COMPREHEN METABOLIC PANEL: CPT

## 2023-10-19 PROCEDURE — 93005 ELECTROCARDIOGRAM TRACING: CPT

## 2023-10-19 PROCEDURE — 85025 COMPLETE CBC W/AUTO DIFF WBC: CPT

## 2023-10-19 PROCEDURE — 83735 ASSAY OF MAGNESIUM: CPT

## 2023-10-19 PROCEDURE — 81001 URINALYSIS AUTO W/SCOPE: CPT

## 2023-10-19 PROCEDURE — 71046 X-RAY EXAM CHEST 2 VIEWS: CPT

## 2023-10-19 PROCEDURE — 83605 ASSAY OF LACTIC ACID: CPT

## 2023-10-19 PROCEDURE — 36415 COLL VENOUS BLD VENIPUNCTURE: CPT

## 2023-10-19 PROCEDURE — 99285 EMERGENCY DEPT VISIT HI MDM: CPT

## 2023-10-19 NOTE — XR
EXAMINATION TYPE: XR chest 2V

 

DATE OF EXAM: 10/19/2023 7:12 AM

 

CLINICAL INDICATION:Male, 73 years old with history of altered mental status;

 

COMPARISON: Chest radiographs from 3/6/2021

 

TECHNIQUE: XR chest 2V Frontal and lateral views of the chest.

 

FINDINGS: 

Lungs/Pleura: There is no evidence of pleural effusion, focal consolidation, or pneumothorax.  

Pulmonary vascularity: Unremarkable.

Heart/mediastinum: Cardiomediastinal silhouette is unremarkable.

Musculoskeletal: No acute osseous pathology.

 

IMPRESSION: 

No acute cardiopulmonary disease/process.

## 2025-04-01 ENCOUNTER — HOSPITAL ENCOUNTER (EMERGENCY)
Dept: HOSPITAL 47 - EC | Age: 75
Discharge: HOME | End: 2025-04-01
Payer: MEDICARE

## 2025-04-01 VITALS — HEART RATE: 89 BPM

## 2025-04-01 VITALS — TEMPERATURE: 98.3 F | SYSTOLIC BLOOD PRESSURE: 170 MMHG | RESPIRATION RATE: 18 BRPM | DIASTOLIC BLOOD PRESSURE: 82 MMHG

## 2025-04-01 DIAGNOSIS — N39.0: Primary | ICD-10-CM

## 2025-04-01 DIAGNOSIS — Z91.030: ICD-10-CM

## 2025-04-01 DIAGNOSIS — Z88.0: ICD-10-CM

## 2025-04-01 DIAGNOSIS — R41.0: ICD-10-CM

## 2025-04-01 LAB
ALBUMIN SERPL-MCNC: 3.9 G/DL (ref 3.5–5)
ALP SERPL-CCNC: 106 U/L (ref 38–126)
ALT SERPL-CCNC: 6 U/L (ref 4–49)
ANION GAP SERPL CALC-SCNC: 6 MMOL/L
APTT BLD: 22.5 SEC (ref 22–30)
AST SERPL-CCNC: 25 U/L (ref 17–59)
BASOPHILS NFR BLD AUTO: 0 %
BUN SERPL-SCNC: 27 MG/DL (ref 9–20)
CALCIUM SPEC-MCNC: 8.8 MG/DL (ref 8.4–10.2)
CHLORIDE SERPL-SCNC: 100 MMOL/L (ref 98–107)
CO2 SERPL-SCNC: 29 MMOL/L (ref 22–30)
EOSINOPHIL # BLD AUTO: 0.2 K/UL (ref 0–0.7)
EOSINOPHIL NFR BLD AUTO: 1 %
ERYTHROCYTE [DISTWIDTH] IN BLOOD BY AUTOMATED COUNT: 4.51 M/UL (ref 4.3–5.9)
ERYTHROCYTE [DISTWIDTH] IN BLOOD: 12.8 % (ref 11.5–15.5)
GLUCOSE SERPL-MCNC: 90 MG/DL (ref 74–99)
HCT VFR BLD AUTO: 42.3 % (ref 39–53)
LYMPHOCYTES # SPEC AUTO: 0.6 K/UL (ref 1–4.8)
LYMPHOCYTES NFR SPEC AUTO: 6 %
MAGNESIUM SPEC-SCNC: 1.7 MG/DL (ref 1.6–2.3)
MCH RBC QN AUTO: 31.1 PG (ref 25–35)
MCHC RBC AUTO-ENTMCNC: 33.1 G/DL (ref 31–37)
MONOCYTES # BLD AUTO: 0.5 K/UL (ref 0–1)
MONOCYTES NFR BLD AUTO: 4 %
NEUTROPHILS # BLD AUTO: 9.9 K/UL (ref 1.3–7.7)
NEUTROPHILS NFR BLD AUTO: 88 %
NT-PROBNP SERPL-MCNC: 491 PG/ML
PLATELET # BLD AUTO: 202 K/UL (ref 150–450)
POTASSIUM SERPL-SCNC: 4.8 MMOL/L (ref 3.5–5.1)
PROT SERPL-MCNC: 6.6 G/DL (ref 6.3–8.2)
SODIUM SERPL-SCNC: 135 MMOL/L (ref 137–145)
WBC # BLD AUTO: 11.2 K/UL (ref 3.8–10.6)

## 2025-04-01 PROCEDURE — 93005 ELECTROCARDIOGRAM TRACING: CPT

## 2025-04-01 PROCEDURE — 85730 THROMBOPLASTIN TIME PARTIAL: CPT

## 2025-04-01 PROCEDURE — 84484 ASSAY OF TROPONIN QUANT: CPT

## 2025-04-01 PROCEDURE — 84443 ASSAY THYROID STIM HORMONE: CPT

## 2025-04-01 PROCEDURE — 83735 ASSAY OF MAGNESIUM: CPT

## 2025-04-01 PROCEDURE — 71046 X-RAY EXAM CHEST 2 VIEWS: CPT

## 2025-04-01 PROCEDURE — 80053 COMPREHEN METABOLIC PANEL: CPT

## 2025-04-01 PROCEDURE — 85610 PROTHROMBIN TIME: CPT

## 2025-04-01 PROCEDURE — 84100 ASSAY OF PHOSPHORUS: CPT

## 2025-04-01 PROCEDURE — 83605 ASSAY OF LACTIC ACID: CPT

## 2025-04-01 PROCEDURE — 83880 ASSAY OF NATRIURETIC PEPTIDE: CPT

## 2025-04-01 PROCEDURE — 85025 COMPLETE CBC W/AUTO DIFF WBC: CPT

## 2025-04-01 PROCEDURE — 36415 COLL VENOUS BLD VENIPUNCTURE: CPT

## 2025-04-01 PROCEDURE — 96360 HYDRATION IV INFUSION INIT: CPT

## 2025-04-01 PROCEDURE — 99285 EMERGENCY DEPT VISIT HI MDM: CPT

## 2025-04-01 RX ADMIN — CEFAZOLIN ONE MLS/HR: 330 INJECTION, POWDER, FOR SOLUTION INTRAMUSCULAR; INTRAVENOUS at 18:17

## 2025-04-01 RX ADMIN — CEPHALEXIN STA MG: 500 CAPSULE ORAL at 20:08

## 2025-06-30 ENCOUNTER — HOSPITAL ENCOUNTER (EMERGENCY)
Dept: HOSPITAL 47 - EC | Age: 75
LOS: 1 days | Discharge: HOME | End: 2025-07-01
Payer: MEDICARE

## 2025-06-30 VITALS — TEMPERATURE: 98.1 F | RESPIRATION RATE: 18 BRPM

## 2025-06-30 DIAGNOSIS — K80.20: Primary | ICD-10-CM

## 2025-06-30 DIAGNOSIS — Z88.0: ICD-10-CM

## 2025-06-30 DIAGNOSIS — Z91.030: ICD-10-CM

## 2025-06-30 DIAGNOSIS — F03.90: ICD-10-CM

## 2025-06-30 PROCEDURE — 99285 EMERGENCY DEPT VISIT HI MDM: CPT

## 2025-06-30 PROCEDURE — 84484 ASSAY OF TROPONIN QUANT: CPT

## 2025-06-30 PROCEDURE — 85025 COMPLETE CBC W/AUTO DIFF WBC: CPT

## 2025-06-30 PROCEDURE — 81003 URINALYSIS AUTO W/O SCOPE: CPT

## 2025-06-30 PROCEDURE — 76705 ECHO EXAM OF ABDOMEN: CPT

## 2025-06-30 PROCEDURE — 83605 ASSAY OF LACTIC ACID: CPT

## 2025-06-30 PROCEDURE — 83690 ASSAY OF LIPASE: CPT

## 2025-06-30 PROCEDURE — 36415 COLL VENOUS BLD VENIPUNCTURE: CPT

## 2025-06-30 PROCEDURE — 80053 COMPREHEN METABOLIC PANEL: CPT

## 2025-06-30 PROCEDURE — 74177 CT ABD & PELVIS W/CONTRAST: CPT

## 2025-07-01 VITALS — DIASTOLIC BLOOD PRESSURE: 80 MMHG | HEART RATE: 78 BPM | SYSTOLIC BLOOD PRESSURE: 160 MMHG

## 2025-07-01 LAB
ALBUMIN SERPL-MCNC: 3.4 G/DL (ref 3.5–5)
ALP SERPL-CCNC: 98 U/L (ref 38–126)
ALT SERPL-CCNC: 8 U/L (ref 4–49)
AMORPH SED URNS QL MICRO: (no result) /HPF
ANION GAP SERPL CALC-SCNC: 7 MMOL/L
ANISOCYTOSIS BLD QL SMEAR: (no result)
APPEARANCE UR: CLEAR
AST SERPL-CCNC: 21 U/L (ref 17–59)
BACTERIA UR QL AUTO: (no result) /HPF
BASO STIPL BLD QL SMEAR: (no result)
BASOPHILS # BLD AUTO: 0.02 10*3/UL (ref 0–0.1)
BASOPHILS NFR BLD AUTO: 0.2 %
BILIRUB BLD-MCNC: 1 MG/DL (ref 0.2–1.3)
BILIRUB UR QL CFM: (no result)
BILIRUB UR QL STRIP.AUTO: NEGATIVE
BROAD CASTS [PRESENCE] IN URINE BY COMPUTER ASSISTED METHOD: (no result) /LPF
BUN SERPL-SCNC: 30 MG/DL (ref 9–20)
BURR CELLS BLD QL SMEAR: (no result)
CA CARBONATE CRY #/AREA URNS HPF: (no result) /HPF
CA PHOS CRY UR QL COMP ASSIST: (no result) /HPF
CALCIUM SPEC-MCNC: 8.9 MG/DL (ref 8.4–10.2)
CAOX CRY UR QL COMP ASSIST: (no result) /HPF
CASTS URNS QL MICRO: (no result) /LPF
CHLORIDE SERPL-SCNC: 103 MMOL/L (ref 98–107)
CO2 SERPL-SCNC: 26 MMOL/L (ref 22–30)
COLOR UR: YELLOW
CREATININE: 0.93 MG/DL (ref 0.66–1.25)
CRYSTALS UR QL: (no result) /HPF
CYSTINE CRY #/AREA URNS HPF: (no result) /HPF
DACRYOCYTES BLD QL SMEAR: (no result)
DOHLE BOD BLD QL SMEAR: (no result)
EOSINOPHIL # BLD AUTO: 0.12 10*3/UL (ref 0.04–0.35)
EOSINOPHIL NFR BLD AUTO: 1.2 %
EPITHELIAL CASTS [PRESENCE] IN URINE BY COMPUTER ASSISTED METHOD: (no result) /LPF
ERYTHROCYTE CLUMPS [PRESENCE] IN URINE BY COMPUTER ASSISTED METHOD: (no result) /HPF
ERYTHROCYTE [DISTWIDTH] IN BLOOD BY AUTOMATED COUNT: 3.96 10*6/UL (ref 4.4–5.6)
ERYTHROCYTE [DISTWIDTH] IN BLOOD: 12.9 % (ref 11.5–14.5)
FATTY CASTS #/AREA UR COMP ASSIST: (no result) /LPF
GLUCOSE SERPL-MCNC: 103 MG/DL (ref 74–99)
GLUCOSE UR QL: NEGATIVE
GRAN CASTS UR QL COMP ASSIST: (no result) /LPF
HCT VFR BLD AUTO: 36.8 % (ref 39.6–50)
HGB BLD-MCNC: 12.6 G/DL (ref 13–17)
HOWELL-JOLLY BOD BLD QL SMEAR: (no result)
HYALINE CASTS UR QL AUTO: (no result) /LPF (ref 0–2)
HYPOCHROMIA BLD QL SMEAR: (no result)
IMM GRANULOCYTES # BLD: 0.02 10*3/UL (ref 0–0.04)
KETONES UR QL STRIP.AUTO: NEGATIVE
LEUCINE CRYSTALS [PRESENCE] IN URINE BY COMPUTER ASSISTED METHOD: (no result) /HPF
LEUKOCYTE ESTERASE UR QL STRIP.AUTO: NEGATIVE
LG PLATELETS BLD QL SMEAR: (no result)
LIPASE SERPL-CCNC: 58 U/L (ref 23–300)
LYMPHOCYTES # SPEC AUTO: 1.17 10*3/UL (ref 0.9–5)
LYMPHOCYTES NFR SPEC AUTO: 11.6 %
Lab: (no result)
MCH RBC QN AUTO: 31.8 PG (ref 27–32)
MCHC RBC AUTO-ENTMCNC: 34.2 G/DL (ref 32–37)
MCV RBC AUTO: 92.9 FL (ref 80–97)
MIXED CELL CASTS UR QL COMP ASSIST: (no result) /LPF
MONOCYTES # BLD AUTO: 1.07 10*3/UL (ref 0.2–1)
MONOCYTES NFR BLD AUTO: 10.6 %
MUCOUS THREADS UR QL AUTO: (no result) /HPF
NEUTROPHILS # BLD AUTO: 7.71 10*3/UL (ref 1.8–7.7)
NEUTROPHILS NFR BLD AUTO: 76.2 %
NEUTS HYPERSEG # BLD: (no result) 10*3/UL
NITRITE UR QL STRIP.AUTO: NEGATIVE
NRBC BLD AUTO-RTO: (no result) %
OVAL FAT BODIES #/AREA UR COMP ASSIST: (no result) /HPF
OVALOCYTES BLD QL SMEAR: (no result)
PELGER HUET CELLS BLD QL SMEAR: (no result)
PH UR: 6.5 [PH] (ref 5–8)
PLATELET # BLD AUTO: 159 10*3/UL (ref 140–440)
PLATELETS.RETICULATED NFR BLD AUTO: (no result) %
PMV BLD AUTO: 9.9 FL (ref 9.5–12.2)
POIKILOCYTOSIS BLD QL SMEAR: (no result)
POIKILOCYTOSIS BLD QL SMEAR: (no result)
POLYCHROMASIA BLD QL SMEAR: (no result)
POTASSIUM SERPL-SCNC: 4.2 MMOL/L (ref 3.5–5.1)
PROT SERPL-MCNC: 6 G/DL (ref 6.3–8.2)
PROT UR QL SSA: (no result)
PROT UR QL: NEGATIVE
RBC CASTS UR QL COMP ASSIST: (no result) /LPF
RBC MORPH BLD: (no result)
RBC UR QL: (no result) /HPF (ref 0–5)
RBC UR QL: NEGATIVE
RENAL EPI CELLS UR QL COMP ASSIST: (no result) /HPF
ROULEAUX BLD QL SMEAR: (no result)
SCHISTOCYTES BLD QL SMEAR: (no result)
SICKLE CELLS BLD QL SMEAR: (no result)
SODIUM SERPL-SCNC: 136 MMOL/L (ref 137–145)
SP GR UR: 1.05 (ref 1–1.03)
SPERM # UR AUTO: (no result) /HPF
SPHEROCYTES BLD QL SMEAR: (no result)
SQUAMOUS UR QL AUTO: (no result) /HPF (ref 0–4)
STOMATOCYTES BLD QL SMEAR: (no result)
TARGETS BLD QL SMEAR: (no result)
TOXIC GRANULES BLD QL SMEAR: (no result)
TRANS CELLS UR QL COMP ASSIST: (no result) /HPF (ref 0–1)
TRI-PHOS CRY UR QL COMP ASSIST: (no result) /HPF
TRICHOMONAS UR QL COMP ASSIST: (no result) /HPF
TYROSINE CRYSTALS [PRESENCE] IN URINE BY COMPUTER ASSISTED METHOD: (no result) /HPF
URATE CRY UR QL COMP ASSIST: (no result) /HPF
UROBILINOGEN UR QL STRIP: <2 MG/DL (ref ?–2)
VARIANT LYMPHS BLD QL SMEAR: (no result)
WAXY CASTS #/AREA UR COMP ASSIST: (no result) /LPF
WBC # BLD AUTO: 10.11 10*3/UL (ref 4.5–10)
WBC #/AREA URNS HPF: (no result) /HPF (ref 0–5)
WBC CASTS #/AREA URNS LPF: (no result) /LPF
WBC CLUMPS UR QL AUTO: (no result) /HPF
WBC NRBC COR # BLD: (no result) K/UL
WBC TOXIC VACUOLES BLD QL SMEAR: (no result)
YEAST BUDDING UR QL COMP ASSIST: (no result) /HPF
YEAST HYPHAE UR QL COMP ASSIST: (no result) /HPF